# Patient Record
Sex: FEMALE | Race: WHITE | NOT HISPANIC OR LATINO | Employment: OTHER | URBAN - METROPOLITAN AREA
[De-identification: names, ages, dates, MRNs, and addresses within clinical notes are randomized per-mention and may not be internally consistent; named-entity substitution may affect disease eponyms.]

---

## 2017-01-23 ENCOUNTER — TRANSCRIBE ORDERS (OUTPATIENT)
Dept: ADMINISTRATIVE | Facility: HOSPITAL | Age: 62
End: 2017-01-23

## 2017-01-23 ENCOUNTER — APPOINTMENT (OUTPATIENT)
Dept: LAB | Facility: MEDICAL CENTER | Age: 62
End: 2017-01-23
Payer: COMMERCIAL

## 2017-01-23 DIAGNOSIS — G56.01 CARPAL TUNNEL SYNDROME ON RIGHT: ICD-10-CM

## 2017-01-23 DIAGNOSIS — G56.01 CARPAL TUNNEL SYNDROME ON RIGHT: Primary | ICD-10-CM

## 2017-01-23 LAB
ANION GAP SERPL CALCULATED.3IONS-SCNC: 10 MMOL/L (ref 4–13)
BASOPHILS # BLD AUTO: 0.01 THOUSANDS/ΜL (ref 0–0.1)
BASOPHILS NFR BLD AUTO: 0 % (ref 0–1)
BUN SERPL-MCNC: 14 MG/DL (ref 5–25)
CALCIUM SERPL-MCNC: 8.9 MG/DL (ref 8.3–10.1)
CHLORIDE SERPL-SCNC: 109 MMOL/L (ref 100–108)
CO2 SERPL-SCNC: 23 MMOL/L (ref 21–32)
CREAT SERPL-MCNC: 0.77 MG/DL (ref 0.6–1.3)
EOSINOPHIL # BLD AUTO: 0 THOUSAND/ΜL (ref 0–0.61)
EOSINOPHIL NFR BLD AUTO: 0 % (ref 0–6)
ERYTHROCYTE [DISTWIDTH] IN BLOOD BY AUTOMATED COUNT: 12.8 % (ref 11.6–15.1)
GFR SERPL CREATININE-BSD FRML MDRD: >60 ML/MIN/1.73SQ M
GLUCOSE SERPL-MCNC: 146 MG/DL (ref 65–140)
HCT VFR BLD AUTO: 44.2 % (ref 34.8–46.1)
HGB BLD-MCNC: 14.6 G/DL (ref 11.5–15.4)
LYMPHOCYTES # BLD AUTO: 1.58 THOUSANDS/ΜL (ref 0.6–4.47)
LYMPHOCYTES NFR BLD AUTO: 23 % (ref 14–44)
MCH RBC QN AUTO: 28.3 PG (ref 26.8–34.3)
MCHC RBC AUTO-ENTMCNC: 33 G/DL (ref 31.4–37.4)
MCV RBC AUTO: 86 FL (ref 82–98)
MONOCYTES # BLD AUTO: 0.56 THOUSAND/ΜL (ref 0.17–1.22)
MONOCYTES NFR BLD AUTO: 8 % (ref 4–12)
NEUTROPHILS # BLD AUTO: 4.55 THOUSANDS/ΜL (ref 1.85–7.62)
NEUTS SEG NFR BLD AUTO: 69 % (ref 43–75)
NRBC BLD AUTO-RTO: 0 /100 WBCS
PLATELET # BLD AUTO: 344 THOUSANDS/UL (ref 149–390)
PMV BLD AUTO: 9.4 FL (ref 8.9–12.7)
POTASSIUM SERPL-SCNC: 3.9 MMOL/L (ref 3.5–5.3)
RBC # BLD AUTO: 5.15 MILLION/UL (ref 3.81–5.12)
SODIUM SERPL-SCNC: 142 MMOL/L (ref 136–145)
WBC # BLD AUTO: 6.75 THOUSAND/UL (ref 4.31–10.16)

## 2017-01-23 PROCEDURE — 36415 COLL VENOUS BLD VENIPUNCTURE: CPT

## 2017-01-23 PROCEDURE — 80048 BASIC METABOLIC PNL TOTAL CA: CPT

## 2017-01-23 PROCEDURE — 85025 COMPLETE CBC W/AUTO DIFF WBC: CPT

## 2017-09-06 ENCOUNTER — GENERIC CONVERSION - ENCOUNTER (OUTPATIENT)
Dept: OTHER | Facility: OTHER | Age: 62
End: 2017-09-06

## 2017-09-06 DIAGNOSIS — R92.2 INCONCLUSIVE MAMMOGRAM: ICD-10-CM

## 2017-09-06 DIAGNOSIS — Z12.31 ENCOUNTER FOR SCREENING MAMMOGRAM FOR MALIGNANT NEOPLASM OF BREAST: ICD-10-CM

## 2018-01-11 NOTE — PROGRESS NOTES
Assessment   1  History of Rectocele, female (618 04) (N81 6)  2  Encounter for routine gynecological examination (V72 31) (Z01 419)    Plan   Encounter for routine gynecological examination    · Start: Vagifem 10 MCG Vaginal Tablet; Sig hs in vagina for 14 nights the use 2 x a week  Rx By: Cinthia Castaneda; Dispense: 90 Days ; #:2 X 18 Tablet Box; Refill: 3;For: Encounter   for routine gynecological examination; NIESHA = N; Sent To: [facility]   · Follow-up visit in 1 year Evaluation and Treatment  Follow-up  Status: Hold For -  Scheduling  Requested for: 51Xmz2337  Ordered; For: Encounter for routine gynecological examination;  Ordered By: Jacky Pizano  Performed:   Due: 96Epo6323    Follow-up visit in 1 year Evaluation and Treatment Follow-up Status: Complete Done: 92HAW1084  Ordered; For: Encounter for routine gynecological examination; Ordered By: Cinthia Castaneda Performed:  Due: 63Wti8134; Last Updated By: Jorge Luis Cantu; 4/18/2016 1:30:41 PM   Follow-up visit in 1 year Evaluation and Treatment Follow-up Status: Complete Done: 11Rqj8078  Ordered; For: Encounter for routine gynecological examination; Ordered By: Cinthia Castaneda Performed:  Due: 79TJL5935; Last Updated By: Natanael Dudley; 4/21/2016 2:12:03 PM     Discussion/Summary    Pt with H and P as described  While pt does have some vaginal relaxation and atrophy, there is also some apical tenderness that reproduces "cutting" feeling with vaginal intercourse and I wonder it it is her ovary at top of vaginal being bumped  To change to Vagifem daily x 14 days, then twice weekly , faithful use urged, and water soluble lubrication  , positional change, if motivated to have intercourse  HPV of vagina done as matter of record  Mammography ordered  what unhappy  Since no Pap on record, despite hyst, will do HPVHR  Mammography reordered  Osteopenia on BMD explained and urged weight bearing exercise, Vit D and Calcium 1200 mg per day   Urged colonoscopy, but pt opposed to both it and rectal exam  Pt to RT for yearly visit in 1 year or prn  Chief Complaint  59year old woman, for annual gyn visit      History of Present Illness  HPI: Pt cared for in past by by Zenobia Hoskins MD , , a post menopausal Gr 4 P 4, witn apparent vaginall hysterectomy remotely (27 years ago) for benign reasons, along with anterior and posterior colporrhaphies about 10 years ago  Not happy with current status of vagina, feeling A and P repair not noldiing up too well, and feeling dry, both to pt and "rough' to  with intercourse  Also perhaps some upper vaginal pain with intercourse  Used vaginal Estrace to some extent in past, but thought it was messy and didn't persist  Is luke warm about treating this issue  Occasional odor and irrriation only      No  bleeding, pain, urinary nor bowel complaints  Had breast implants remotely but has some regrets about the choice to do so    Mammography normal in     No Pap available, likley due to hyst  BMD in  with -1 6 result  , not calcium user  Colonoscopy not desired to date  Concerned about weight gain and whether this could be thyroid related  No pain, breast, uriinary or bowel complaint  Now for yearly gyn visit  GYN HM, Adult Female [facility]: The patient is being seen for a gynecology evaluation  General Health:   Reproductive health: the patient is postmenopausal    Screening: Additional History:  See HPI  Review of Systems  Additional findings include See HPI  Constitutional: as noted in HPI  Breasts: as noted in HPI  Gastrointestinal: as noted in HPI  Genitourinary: as noted in HPI  ROS reviewed  OB History  Pregnancy History (Brief):   Prior pregnancies: : 6  Para: 3 (full-term)   Delivery type: 3 vaginal       Active Problems   1  Acute conjunctivitis (372 00) (H10 30)  2  Acute ethmoidal sinusitis (461 2) (J01 20)  3  Acute maxillary sinusitis (461 0) (J01 00)  4   Acute pharyngitis (462) (J02 9)  5  Carpal tunnel syndrome of right wrist (354 0) (G56 01)  6  Depressed mood (311) (F32 9)  7  Dysfunction of both Eustachian tubes (381 81) (H69 83)  8  Encounter for routine gynecological examination (V72 31) (Z01 419)  9  Encounter for screening mammogram for malignant neoplasm of breast (V76 12)   (Z12 31)  10  Osteoarthritis of hand (715 94) (M19 049)  11  Osteopenia (733 90) (M85 80)  12  Rosacea (695 3) (L71 9)  13  Trigger middle finger of right hand (727 03) (M65 331)  14  Trochanteric bursitis of right hip (726 5) (M70 61)    Past Medical History    · History Of 4  Previous Pregnancies (V61 5)   · History of acute bronchitis (V12 69) (Z87 09)   · History of chicken pox (V12 09) (Z86 19)   · History of mumps (V12 09) (Z86 19)   · History of Measles (055 9) (B05 9)   · History of Previous Pregnancies Resulted In 3  Living Children   · History of Rectocele, female (618 04) (N81 6)    Surgical History    · History of Anterior Colporrhaphy, Repair Of Cystocele   · History of Breast Surgery Enlargement Procedure   · History of Hysterectomy   · History of Rectocele Repair   · History of Shoulder Arthroscopy With Rotator Cuff Repair   · History of Tubal Ligation    The surgical history was reviewed and updated today  Family History    · No pertinent family history    · Family history of Heart Disease (V17 49)   · Family history of Osteoporosis (V17 81)    Social History    · Always uses seat belt   · Being A Social Drinker   · Exercising Regularly   · Never A Smoker    Current Meds  1  Clarithromycin 500 MG Oral Tablet; TAKE 1 TABLET TWICE DAILY UNTIL FINISHED; Therapy: 86CJU0361 to (Evaluate:08Jan2016)  Requested for: 67Mba4588; Last   Rx:47Daz6165 Ordered  2  Multiple Vitamins Oral Tablet; Take 1 daily; Therapy: 96KUC3210 to (Last Rx:31Mar2014) Ordered  3   Mupirocin Calcium 2 % External Cream; apply to inside of nostrils at hs;   Therapy: 19DWT0997 to (Last Rx:13Beb9997)  Requested for: 13BWV1685 Ordered    Allergies   1  Penicillins    Physical Exam    Constitutional   General appearance: No acute distress, well appearing and well nourished  Neck   Neck: Normal, supple, trachea midline, no masses  Thyroid: Normal, no thyromegaly  Genitourinary   External genitalia: Normal and no lesions appreciated  Vagina: Abnormal   mild cystocele and rectoce, mildy atrophic, slight tender above vagina (?ovary)  Urethra: Normal     Urethral meatus: Normal     Bladder: Normal, soft, non-tender and no prolapse or masses appreciated  Cervix: Surgically absent  Uterus: Surgically absent  Adnexa/parametria: Normal, non-tender and no fullness or masses appreciated  Anus, perineum, and rectum: Normal sphincter tone, no masses, and no prolapse  Chest   Breasts: Normal and no dimpling or skin changes noted  Implants quite firm bilaterally  Abdomen   Abdomen: Normal, non-tender, and no organomegaly noted  Liver and spleen: No hepatomegaly or splenomegaly  Examination for hernias: No hernias appreciated  Lymphatic   Palpation of lymph nodes in neck, axillae, groin and/or other locations: No lymphadenopathy or masses noted  Skin   Skin and subcutaneous tissue: Normal skin turgor and no rashes      Palpation of skin and subcutaneous tissue: Normal     Psychiatric   Orientation to person, place, and time: Normal     Mood and affect: Normal        Signatures   Electronically signed by : Racheal Walden MD; Apr 28 2016 10:00AM EST                       (Author)

## 2018-01-11 NOTE — MISCELLANEOUS
Message  Pt infomred that thyroid studies normal  Discussed weight gain again, with suggestions  Also noted to  and begin Vagifem as ordered   [address]      Signatures   Electronically signed by : Levi Ceja MD; Apr 22 2016 10:21AM EST                       (Author)

## 2018-01-22 VITALS — HEIGHT: 69 IN | SYSTOLIC BLOOD PRESSURE: 130 MMHG | DIASTOLIC BLOOD PRESSURE: 76 MMHG

## 2018-03-06 ENCOUNTER — OFFICE VISIT (OUTPATIENT)
Dept: FAMILY MEDICINE CLINIC | Facility: CLINIC | Age: 63
End: 2018-03-06
Payer: COMMERCIAL

## 2018-03-06 VITALS
HEIGHT: 68 IN | RESPIRATION RATE: 17 BRPM | DIASTOLIC BLOOD PRESSURE: 70 MMHG | SYSTOLIC BLOOD PRESSURE: 140 MMHG | WEIGHT: 162 LBS | HEART RATE: 75 BPM | BODY MASS INDEX: 24.55 KG/M2 | OXYGEN SATURATION: 98 % | TEMPERATURE: 98.7 F

## 2018-03-06 DIAGNOSIS — Z12.31 SCREENING MAMMOGRAM, ENCOUNTER FOR: ICD-10-CM

## 2018-03-06 DIAGNOSIS — R20.0 NUMBNESS OF RIGHT HAND: ICD-10-CM

## 2018-03-06 DIAGNOSIS — E78.2 MIXED HYPERLIPIDEMIA: ICD-10-CM

## 2018-03-06 DIAGNOSIS — R09.81 SINUS CONGESTION: ICD-10-CM

## 2018-03-06 DIAGNOSIS — M70.61 TROCHANTERIC BURSITIS OF RIGHT HIP: ICD-10-CM

## 2018-03-06 DIAGNOSIS — M19.041 OSTEOARTHRITIS OF BOTH HANDS, UNSPECIFIED OSTEOARTHRITIS TYPE: ICD-10-CM

## 2018-03-06 DIAGNOSIS — M19.042 OSTEOARTHRITIS OF BOTH HANDS, UNSPECIFIED OSTEOARTHRITIS TYPE: ICD-10-CM

## 2018-03-06 DIAGNOSIS — M85.80 OSTEOPENIA, UNSPECIFIED LOCATION: ICD-10-CM

## 2018-03-06 DIAGNOSIS — M65.331 TRIGGER MIDDLE FINGER OF RIGHT HAND: ICD-10-CM

## 2018-03-06 DIAGNOSIS — H93.8X3 PLUGGED FEELING IN EAR, BILATERAL: ICD-10-CM

## 2018-03-06 DIAGNOSIS — M43.6 NECK STIFFNESS: ICD-10-CM

## 2018-03-06 DIAGNOSIS — M25.531 RIGHT WRIST PAIN: ICD-10-CM

## 2018-03-06 DIAGNOSIS — J02.9 SORE THROAT: Primary | ICD-10-CM

## 2018-03-06 PROCEDURE — 99214 OFFICE O/P EST MOD 30 MIN: CPT | Performed by: FAMILY MEDICINE

## 2018-03-06 PROCEDURE — 3008F BODY MASS INDEX DOCD: CPT | Performed by: FAMILY MEDICINE

## 2018-03-06 RX ORDER — AZITHROMYCIN 250 MG/1
TABLET, FILM COATED ORAL
Qty: 6 TABLET | Refills: 0 | Status: SHIPPED | OUTPATIENT
Start: 2018-03-06 | End: 2018-03-10

## 2018-03-06 NOTE — PROGRESS NOTES
Assessment/Plan:         Diagnoses and all orders for this visit:    Sore throat  -     azithromycin (ZITHROMAX) 250 mg tablet; 2 po day 1, then 1 po daily for 4 days    Plugged feeling in ear, bilateral  -     azithromycin (ZITHROMAX) 250 mg tablet; 2 po day 1, then 1 po daily for 4 days    Sinus congestion  -     azithromycin (ZITHROMAX) 250 mg tablet; 2 po day 1, then 1 po daily for 4 days    Osteopenia, unspecified location  -     Lipid panel; Future  -     Comprehensive metabolic panel; Future  -     CBC and differential; Future  -     TSH, 3rd generation with T4 reflex; Future  -     DXA bone density spine hip and pelvis; Future    Screening mammogram, encounter for  -     Mammo screening bilateral w 3d & cad; Future    Neck stiffness    Trigger middle finger of right hand    Trochanteric bursitis of right hip    Numbness of right hand    Osteoarthritis of both hands, unspecified osteoarthritis type    Right wrist pain    Mixed hyperlipidemia          Subjective:   Chief Complaint   Patient presents with    Earache    Headache    Sore Throat        Patient ID: Gricel Torre is a 57 y o  female  Per c/c reviewed by me  Lost power on Friday and sx began , getting worse, using hot tea  Did travel to [place] about 2 weeks ago, but no known ill contacts  Incidentally lost 30 pounds doing [employer] program        The following portions of the patient's history were reviewed and updated as appropriate: allergies, current medications, past family history, past medical history, past social history, past surgical history and problem list     Review of Systems   Constitutional: Negative  HENT: Positive for ear pain, postnasal drip, rhinorrhea and sore throat  Negative for congestion, ear discharge, hearing loss, mouth sores, nosebleeds, sinus pain, sinus pressure, sneezing, tinnitus, trouble swallowing and voice change  Eyes: Negative  Respiratory: Negative  Cardiovascular: Negative  Gastrointestinal: Negative  Neurological: Positive for headaches  Negative for dizziness, facial asymmetry and light-headedness  Objective:      /70 (BP Location: Left arm, Patient Position: Sitting, Cuff Size: Standard)   Pulse 75   Temp 98 7 °F (37 1 °C)   Resp 17   Ht 5' 8" (1 727 m)   Wt 73 5 kg (162 lb)   SpO2 98%   BMI 24 63 kg/m²          Physical Exam   Constitutional: She appears well-developed and well-nourished  She is cooperative  Non-toxic appearance  She does not have a sickly appearance  She does not appear ill  No distress  HENT:   Head: Normocephalic and atraumatic  Right Ear: Hearing, tympanic membrane, external ear and ear canal normal    Left Ear: Hearing, tympanic membrane, external ear and ear canal normal    Nose: Mucosal edema and rhinorrhea present  No epistaxis  Right sinus exhibits no maxillary sinus tenderness and no frontal sinus tenderness  Left sinus exhibits no maxillary sinus tenderness and no frontal sinus tenderness  Mouth/Throat: Uvula is midline and mucous membranes are normal  Posterior oropharyngeal erythema present  No oropharyngeal exudate, posterior oropharyngeal edema or tonsillar abscesses  Eyes: Lids are normal  Pupils are equal, round, and reactive to light  Neck: Trachea normal  Neck supple  No thyroid mass and no thyromegaly present  Cardiovascular: Normal rate, regular rhythm and normal heart sounds  Pulmonary/Chest: Effort normal and breath sounds normal    Lymphadenopathy:     She has no cervical adenopathy  Neurological: She is alert  Skin: Skin is warm and dry  No rash noted  She is not diaphoretic  Nursing note and vitals reviewed

## 2018-03-08 NOTE — PATIENT INSTRUCTIONS
Toward end of visit, pt requests some kind of documentation or note to try to get stand-up type desk at work due to numerous musculoskeletal c/o and problems with symptoms aggravated when she works at [employer]   Advised f/u after testing

## 2018-07-03 ENCOUNTER — TRANSCRIBE ORDERS (OUTPATIENT)
Dept: ADMINISTRATIVE | Facility: HOSPITAL | Age: 63
End: 2018-07-03

## 2018-07-03 DIAGNOSIS — S86.302A PERONEAL TENDON INJURY, LEFT, INITIAL ENCOUNTER: Primary | ICD-10-CM

## 2018-07-06 ENCOUNTER — HOSPITAL ENCOUNTER (OUTPATIENT)
Dept: RADIOLOGY | Facility: IMAGING CENTER | Age: 63
Discharge: HOME | End: 2018-07-06
Payer: COMMERCIAL

## 2018-07-06 DIAGNOSIS — S86.302A PERONEAL TENDON INJURY, LEFT, INITIAL ENCOUNTER: ICD-10-CM

## 2018-07-06 PROCEDURE — 73721 MRI JNT OF LWR EXTRE W/O DYE: CPT

## 2018-07-14 ENCOUNTER — APPOINTMENT (OUTPATIENT)
Dept: LAB | Facility: IMAGING CENTER | Age: 63
End: 2018-07-14
Payer: COMMERCIAL

## 2018-07-14 ENCOUNTER — TRANSCRIBE ORDERS (OUTPATIENT)
Dept: ADMINISTRATIVE | Facility: HOSPITAL | Age: 63
End: 2018-07-14

## 2018-07-14 DIAGNOSIS — Z01.812 PRE-OPERATIVE LABORATORY EXAMINATION: Primary | ICD-10-CM

## 2018-07-14 DIAGNOSIS — Z01.812 PRE-OPERATIVE LABORATORY EXAMINATION: ICD-10-CM

## 2018-07-14 LAB
ANION GAP SERPL CALCULATED.3IONS-SCNC: 6 MMOL/L (ref 4–13)
BUN SERPL-MCNC: 17 MG/DL (ref 5–25)
CALCIUM SERPL-MCNC: 9.1 MG/DL (ref 8.3–10.1)
CHLORIDE SERPL-SCNC: 110 MMOL/L (ref 100–108)
CO2 SERPL-SCNC: 24 MMOL/L (ref 21–32)
CREAT SERPL-MCNC: 0.79 MG/DL (ref 0.6–1.3)
ERYTHROCYTE [DISTWIDTH] IN BLOOD BY AUTOMATED COUNT: 12.1 % (ref 11.6–15.1)
GFR SERPL CREATININE-BSD FRML MDRD: 79 ML/MIN/1.73SQ M
GLUCOSE P FAST SERPL-MCNC: 108 MG/DL (ref 65–99)
HCT VFR BLD AUTO: 45 % (ref 34.8–46.1)
HGB BLD-MCNC: 14.6 G/DL (ref 11.5–15.4)
MCH RBC QN AUTO: 28.7 PG (ref 26.8–34.3)
MCHC RBC AUTO-ENTMCNC: 32.4 G/DL (ref 31.4–37.4)
MCV RBC AUTO: 89 FL (ref 82–98)
PLATELET # BLD AUTO: 381 THOUSANDS/UL (ref 149–390)
PMV BLD AUTO: 9.4 FL (ref 8.9–12.7)
POTASSIUM SERPL-SCNC: 4.6 MMOL/L (ref 3.5–5.3)
RBC # BLD AUTO: 5.08 MILLION/UL (ref 3.81–5.12)
SODIUM SERPL-SCNC: 140 MMOL/L (ref 136–145)
WBC # BLD AUTO: 6.11 THOUSAND/UL (ref 4.31–10.16)

## 2018-07-14 PROCEDURE — 85610 PROTHROMBIN TIME: CPT

## 2018-07-14 PROCEDURE — 36415 COLL VENOUS BLD VENIPUNCTURE: CPT

## 2018-07-14 PROCEDURE — 85027 COMPLETE CBC AUTOMATED: CPT

## 2018-07-14 PROCEDURE — 80048 BASIC METABOLIC PNL TOTAL CA: CPT

## 2018-07-15 LAB
INR PPP: 0.96 (ref 0.86–1.17)
PROTHROMBIN TIME: 12.9 SECONDS (ref 11.8–14.2)

## 2018-07-16 RX ORDER — GENTAMICIN SULFATE 3 MG/ML
SOLUTION/ DROPS OPHTHALMIC
COMMUNITY
End: 2018-07-19

## 2018-07-16 RX ORDER — CLINDAMYCIN HYDROCHLORIDE 300 MG/1
CAPSULE ORAL
COMMUNITY
End: 2018-07-19

## 2018-07-16 RX ORDER — IBUPROFEN 200 MG
200 TABLET ORAL
COMMUNITY
End: 2018-07-19

## 2018-07-16 RX ORDER — AZITHROMYCIN 250 MG/1
TABLET, FILM COATED ORAL
COMMUNITY
End: 2018-07-19

## 2018-07-19 ENCOUNTER — OFFICE VISIT (OUTPATIENT)
Dept: FAMILY MEDICINE CLINIC | Facility: CLINIC | Age: 63
End: 2018-07-19
Payer: COMMERCIAL

## 2018-07-19 VITALS
WEIGHT: 165 LBS | DIASTOLIC BLOOD PRESSURE: 70 MMHG | TEMPERATURE: 98.3 F | OXYGEN SATURATION: 97 % | HEART RATE: 86 BPM | BODY MASS INDEX: 24.37 KG/M2 | RESPIRATION RATE: 16 BRPM | SYSTOLIC BLOOD PRESSURE: 124 MMHG

## 2018-07-19 DIAGNOSIS — R73.01 IMPAIRED FASTING GLUCOSE: ICD-10-CM

## 2018-07-19 DIAGNOSIS — Z12.11 SCREEN FOR COLON CANCER: ICD-10-CM

## 2018-07-19 DIAGNOSIS — Z01.818 PREOPERATIVE CLEARANCE: Primary | ICD-10-CM

## 2018-07-19 PROBLEM — R92.2 DENSE BREASTS: Status: ACTIVE | Noted: 2017-09-06

## 2018-07-19 PROBLEM — R92.30 DENSE BREASTS: Status: ACTIVE | Noted: 2017-09-06

## 2018-07-19 PROCEDURE — 99214 OFFICE O/P EST MOD 30 MIN: CPT | Performed by: PHYSICIAN ASSISTANT

## 2018-07-19 NOTE — PROGRESS NOTES
Routine Follow-up    Eva Nowak 70 y o  female   Date:  7/19/2018      Assessment and Plan:    Jose Padilla was seen today for pre-op exam     Diagnoses and all orders for this visit:    Preoperative clearance    Screen for colon cancer  -     Ambulatory referral to Gastroenterology;  Future    Impaired fasting glucose            HPI:  Chief Complaint   Patient presents with    Pre-op Exam      PERONEAL TENDON REPAIR (Left Ankle)     HPI    ROS: Review of Systems    Past Medical History:   Diagnosis Date    Rectocele     last assessed 4/18/16     Patient Active Problem List   Diagnosis    Sore throat    Plugged feeling in ear, bilateral    Sinus congestion    Mixed hyperlipidemia    Osteopenia    Right wrist pain    Osteoarthritis of hand    Numbness of right hand    Trochanteric bursitis of right hip    Trigger middle finger of right hand    Rosacea    Elevated CPK    Depressed mood    Dense breasts       Past Surgical History:   Procedure Laterality Date    BREAST SURGERY      enlargement    COLPORRHAPHY      anterior, repair of cystocele, last assessed 8/14/14    HYSTERECTOMY      REPAIR RECTOCELE      last assessed 8/14/14    SHOULDER ARTHROSCOPY W/ ROTATOR CUFF REPAIR  12/2014    [facility]    TUBAL LIGATION         Social History     Social History    Marital status: /Civil Union     Spouse name: N/A    Number of children: N/A    Years of education: N/A     Social History Main Topics    Smoking status: Never Smoker    Smokeless tobacco: Never Used    Alcohol use Yes      Comment: social    Drug use: No    Sexual activity: Not Asked     Other Topics Concern    None     Social History Narrative    Always uses seatbelt    Exercising regular           Family History   Problem Relation Age of Onset    No Known Problems Father     Heart disease Family     Osteoporosis Family        Allergies   Allergen Reactions    Penicillins Seizures       No current outpatient prescriptions on file        Physical Exam:  /70   Pulse 86   Temp 98 3 °F (36 8 °C)   Resp 16   Wt 74 8 kg (165 lb)   SpO2 97%   BMI 24 37 kg/m²     Physical Exam      Labs:  Lab Results   Component Value Date    WBC 6 11 07/14/2018    HGB 14 6 07/14/2018    HCT 45 0 07/14/2018    MCV 89 07/14/2018     07/14/2018     Lab Results   Component Value Date     07/14/2018    K 4 6 07/14/2018     (H) 07/14/2018    CO2 24 07/14/2018    ANIONGAP 6 07/14/2018    BUN 17 07/14/2018    CREATININE 0 79 07/14/2018    GLUCOSE 146 (H) 01/23/2017    GLUF 108 (H) 07/14/2018    CALCIUM 9 1 07/14/2018    AST 27 11/28/2016    ALT 45 11/28/2016    ALKPHOS 109 11/28/2016    PROT 7 5 11/28/2016    BILITOT 0 47 11/28/2016    EGFR 79 07/14/2018

## 2018-07-19 NOTE — PROGRESS NOTES
PRE-OPERATIVE EVALUATION  FAMILY PRACTICE AT [facility]    NAME: Ruth Hines  AGE: 70 y o  SEX: female  : 1955     DATE: 2018    Internal Medicine Pre-Operative Evaluation      Chief Complaint: Pre-operative Evaluation     Surgery: PERONEAL TENDON REPAIR (Left Ankle)  Anticipated Date of Surgery: 18  Referring Provider: Mele Harvey DPM     History of Present Illness:     Ruth Hines is a 70 y o  female who presents to the office today for a preoperative consultation  Planned anesthesia is general and with block   Patient has a bleeding risk of: no recent abnormal bleeding  Patient does not have objections to receiving blood products if needed  Current anti-platelet/anti-coagulation medications that the patient is prescribed includes: none  Assessment of Chronic Conditions:   - none   - has impaired fasting glucose on blood work, refused hga1c      Assessment of Cardiac Risk:  · Denies unstable or severe angina or MI in the last 6 weeks or history of stent placement in the last year   · Denies decompensated heart failure (e g  New onset heart failure, NYHA functional class IV heart failure, or worsening existing heart failure)  · Denies significant arrhythmias such as high grade AV block, symptomatic ventricular arrhythmia, newly recognized ventricular tachycardia, supraventricular tachycardia with resting heart rate >100, or symptomatic bradycardia  · Denies severe heart valve disease including aortic stenosis or symptomatic mitral stenosis     Exercise Capacity:  · Able to walk 4 blocks without symptoms?: Yes  · Able to walk 2 flights without symptoms?: Yes    /Prior Anesthesia Reactions: Yes, nausea/vomiting     Personal history of venous thromboembolic disease? No    History of steroid use for >2 weeks within last year? No       Review of Systems:     Review of Systems   Constitutional: Negative for chills, fatigue, fever and unexpected weight change     HENT: Negative for congestion, ear pain, hearing loss, nosebleeds, sore throat and trouble swallowing  Eyes: Negative for pain, discharge and visual disturbance  Respiratory: Negative for cough, shortness of breath and wheezing  Cardiovascular: Negative for chest pain, palpitations and leg swelling  Gastrointestinal: Negative for abdominal pain, blood in stool, constipation, diarrhea, nausea and vomiting  Endocrine: Negative for cold intolerance and heat intolerance  Genitourinary: Negative for difficulty urinating, dysuria and hematuria  Musculoskeletal: Positive for arthralgias and gait problem (wearing boot on LLE)  Negative for back pain and myalgias  Skin: Negative for color change, rash and wound  Neurological: Negative for dizziness, syncope, weakness, light-headedness and headaches  Hematological: Negative for adenopathy  Does not bruise/bleed easily  Psychiatric/Behavioral: Negative for confusion and sleep disturbance  The patient is not nervous/anxious  Current Problem List:     Patient Active Problem List   Diagnosis    Mixed hyperlipidemia    Osteopenia    Osteoarthritis of hand    Numbness of right hand    Trochanteric bursitis of right hip    Rosacea    Elevated CPK    Depressed mood    Dense breasts       Allergies: Allergies   Allergen Reactions    Penicillins Seizures       Physical Exam:     No current outpatient prescriptions on file      Past Medical History:       Past Medical History:   Diagnosis Date    Rectocele     last assessed 4/18/16        Past Surgical History:   Procedure Laterality Date    BREAST SURGERY      enlargement    COLPORRHAPHY      anterior, repair of cystocele, last assessed 8/14/14    HYSTERECTOMY      REPAIR RECTOCELE      last assessed 8/14/14    SHOULDER ARTHROSCOPY W/ ROTATOR CUFF REPAIR  12/2014    Dr Jere Mcgraw at [facility]    TUBAL LIGATION          Family History   Problem Relation Age of Onset    No Known Problems Father    Chuck Saab Heart disease Family     Osteoporosis Family         Social History     Social History    Marital status: /Civil Union     Spouse name: N/A    Number of children: N/A    Years of education: N/A     Occupational History    Not on file  Social History Main Topics    Smoking status: Never Smoker    Smokeless tobacco: Never Used    Alcohol use Yes      Comment: social    Drug use: No    Sexual activity: Not on file     Other Topics Concern    Not on file     Social History Narrative    Always uses seatbelt    Exercising regular            Physical Exam:     Physical Exam   Constitutional: She is oriented to person, place, and time  She appears well-developed and well-nourished  No distress  HENT:   Head: Normocephalic and atraumatic  Right Ear: External ear normal    Left Ear: External ear normal    Mouth/Throat: Oropharynx is clear and moist    Eyes: Conjunctivae are normal  Pupils are equal, round, and reactive to light  Right eye exhibits no discharge  Left eye exhibits no discharge  Neck: Normal range of motion  Neck supple  Cardiovascular: Normal rate, regular rhythm, normal heart sounds and intact distal pulses  No murmur heard  Pulmonary/Chest: Breath sounds normal  No respiratory distress  Musculoskeletal: She exhibits deformity (LLE boot )  She exhibits no edema  Lymphadenopathy:     She has no cervical adenopathy  Neurological: She is alert and oriented to person, place, and time  No cranial nerve deficit  Skin: Skin is warm and dry  No rash noted  She is not diaphoretic  Psychiatric: She has a normal mood and affect  Her behavior is normal         Data:     Pre-operative work-up    Laboratory Results: I have personally reviewed the pertinent laboratory results/reports    CBC wnl, bnp wnl with exception of glucose 110, pt inr wnl     EKG: I have personally reviewed pertinent reports  2016 NSR        Assessment & Recommendations:     1   Preoperative clearance   - cleared, no further testing needed   2  Screen for colon cancer  - refuses colonoscopy   3  Impaired fasting glucose  - refused hga1c        Pre-Op Evaluation Assessment  Known risk factors for perioperative complications: None  Cardiac Risk Estimation: per the Revised Cardiac Risk Index (Circ  100:1043, 1999), the patient's risk factors for cardiac complications include none, putting her in: RCI RISK CLASS I (0 risk factors, risk of major cardiac compl  appr  0 5%)  Current medications which may produce withdrawal symptoms if withheld perioperatively: none  Pre-Op Evaluation Plan  1  Further preoperative workup as follows:   - None; no further preoperative work-up is required    2  Medication Management/Recommendations:   - Not applicable, not on any medications    3  Prophylaxis for cardiac events with perioperative beta-blockers: not indicated  4  Patient requires further consultation with: None    Clearance  Patient is CLEARED for surgery without any additional cardiac testing       [facility] AT [address] [place] [address]  Phone#  [phone]  Fax#  [phone]

## 2018-07-20 ENCOUNTER — ANESTHESIA EVENT (OUTPATIENT)
Dept: PERIOP | Facility: HOSPITAL | Age: 63
End: 2018-07-20
Payer: COMMERCIAL

## 2018-07-20 PROBLEM — R09.81 SINUS CONGESTION: Status: RESOLVED | Noted: 2018-03-06 | Resolved: 2018-07-20

## 2018-07-23 ENCOUNTER — HOSPITAL ENCOUNTER (OUTPATIENT)
Dept: RADIOLOGY | Facility: HOSPITAL | Age: 63
Setting detail: OUTPATIENT SURGERY
Discharge: HOME | End: 2018-07-23
Payer: COMMERCIAL

## 2018-07-23 ENCOUNTER — ANESTHESIA (OUTPATIENT)
Dept: PERIOP | Facility: HOSPITAL | Age: 63
End: 2018-07-23
Payer: COMMERCIAL

## 2018-07-23 ENCOUNTER — HOSPITAL ENCOUNTER (OUTPATIENT)
Facility: HOSPITAL | Age: 63
Setting detail: OUTPATIENT SURGERY
Discharge: HOME | End: 2018-07-23
Attending: PODIATRIST | Admitting: PODIATRIST
Payer: COMMERCIAL

## 2018-07-23 VITALS
DIASTOLIC BLOOD PRESSURE: 72 MMHG | RESPIRATION RATE: 20 BRPM | TEMPERATURE: 97.5 F | SYSTOLIC BLOOD PRESSURE: 164 MMHG | HEIGHT: 69 IN | OXYGEN SATURATION: 98 % | HEART RATE: 66 BPM | BODY MASS INDEX: 23.7 KG/M2 | WEIGHT: 160 LBS

## 2018-07-23 DIAGNOSIS — Z98.890 S/P FOOT SURGERY, LEFT: Primary | ICD-10-CM

## 2018-07-23 DIAGNOSIS — M76.72 PERONEAL TENDINITIS OF LEFT LOWER EXTREMITY: ICD-10-CM

## 2018-07-23 DEVICE — (100MG CHRG BY MG)GRAFT CLARIX FLO 100MG: Type: IMPLANTABLE DEVICE | Site: ANKLE | Status: FUNCTIONAL

## 2018-07-23 RX ORDER — PROPOFOL 10 MG/ML
INJECTION, EMULSION INTRAVENOUS CONTINUOUS PRN
Status: DISCONTINUED | OUTPATIENT
Start: 2018-07-23 | End: 2018-07-23 | Stop reason: SURG

## 2018-07-23 RX ORDER — ASPIRIN 81 MG/1
81 TABLET ORAL 2 TIMES DAILY
Qty: 60 TABLET | Refills: 0 | Status: SHIPPED | OUTPATIENT
Start: 2018-07-23 | End: 2020-09-28 | Stop reason: ALTCHOICE

## 2018-07-23 RX ORDER — MIDAZOLAM HYDROCHLORIDE 1 MG/ML
INJECTION INTRAMUSCULAR; INTRAVENOUS AS NEEDED
Status: DISCONTINUED | OUTPATIENT
Start: 2018-07-23 | End: 2018-07-23 | Stop reason: SURG

## 2018-07-23 RX ORDER — ONDANSETRON 2 MG/ML
INJECTION INTRAMUSCULAR; INTRAVENOUS AS NEEDED
Status: DISCONTINUED | OUTPATIENT
Start: 2018-07-23 | End: 2018-07-23 | Stop reason: SURG

## 2018-07-23 RX ORDER — CLINDAMYCIN PHOSPHATE 900 MG/50ML
900 INJECTION INTRAVENOUS ONCE
Status: COMPLETED | OUTPATIENT
Start: 2018-07-23 | End: 2018-07-23

## 2018-07-23 RX ORDER — OMEPRAZOLE 20 MG/1
20 CAPSULE, DELAYED RELEASE ORAL 2 TIMES DAILY
Qty: 30 CAPSULE | Refills: 0 | Status: SHIPPED | OUTPATIENT
Start: 2018-07-23 | End: 2019-06-13

## 2018-07-23 RX ORDER — MAGNESIUM HYDROXIDE 1200 MG/15ML
LIQUID ORAL AS NEEDED
Status: DISCONTINUED | OUTPATIENT
Start: 2018-07-23 | End: 2018-07-23 | Stop reason: HOSPADM

## 2018-07-23 RX ORDER — FENTANYL CITRATE/PF 50 MCG/ML
50 SYRINGE (ML) INJECTION
Status: DISCONTINUED | OUTPATIENT
Start: 2018-07-23 | End: 2018-07-23 | Stop reason: HOSPADM

## 2018-07-23 RX ORDER — PROPOFOL 10 MG/ML
INJECTION, EMULSION INTRAVENOUS AS NEEDED
Status: DISCONTINUED | OUTPATIENT
Start: 2018-07-23 | End: 2018-07-23 | Stop reason: SURG

## 2018-07-23 RX ORDER — SODIUM CHLORIDE 9 MG/ML
125 INJECTION, SOLUTION INTRAVENOUS CONTINUOUS
Status: DISCONTINUED | OUTPATIENT
Start: 2018-07-23 | End: 2018-07-23 | Stop reason: HOSPADM

## 2018-07-23 RX ORDER — SCOLOPAMINE TRANSDERMAL SYSTEM 1 MG/1
1 PATCH, EXTENDED RELEASE TRANSDERMAL ONCE AS NEEDED
Status: DISCONTINUED | OUTPATIENT
Start: 2018-07-23 | End: 2018-07-23 | Stop reason: HOSPADM

## 2018-07-23 RX ORDER — ONDANSETRON 4 MG/1
4 TABLET, FILM COATED ORAL EVERY 8 HOURS PRN
Qty: 20 TABLET | Refills: 0 | Status: SHIPPED | OUTPATIENT
Start: 2018-07-23 | End: 2019-06-13

## 2018-07-23 RX ORDER — ONDANSETRON 2 MG/ML
4 INJECTION INTRAMUSCULAR; INTRAVENOUS ONCE AS NEEDED
Status: DISCONTINUED | OUTPATIENT
Start: 2018-07-23 | End: 2018-07-23 | Stop reason: HOSPADM

## 2018-07-23 RX ORDER — OXYCODONE HYDROCHLORIDE AND ACETAMINOPHEN 5; 325 MG/1; MG/1
1 TABLET ORAL EVERY 4 HOURS PRN
Status: DISCONTINUED | OUTPATIENT
Start: 2018-07-23 | End: 2018-07-23 | Stop reason: HOSPADM

## 2018-07-23 RX ORDER — ROPIVACAINE HYDROCHLORIDE 5 MG/ML
INJECTION, SOLUTION EPIDURAL; INFILTRATION; PERINEURAL AS NEEDED
Status: DISCONTINUED | OUTPATIENT
Start: 2018-07-23 | End: 2018-07-23 | Stop reason: SURG

## 2018-07-23 RX ORDER — IBUPROFEN 600 MG/1
600 TABLET ORAL EVERY 6 HOURS PRN
Qty: 30 TABLET | Refills: 0 | Status: SHIPPED | OUTPATIENT
Start: 2018-07-23 | End: 2019-06-13

## 2018-07-23 RX ORDER — OXYCODONE HYDROCHLORIDE AND ACETAMINOPHEN 5; 325 MG/1; MG/1
1 TABLET ORAL EVERY 4 HOURS PRN
Qty: 3 TABLET | Refills: 0 | Status: SHIPPED | OUTPATIENT
Start: 2018-07-23 | End: 2018-08-02

## 2018-07-23 RX ORDER — BUPIVACAINE HYDROCHLORIDE 5 MG/ML
INJECTION, SOLUTION PERINEURAL AS NEEDED
Status: DISCONTINUED | OUTPATIENT
Start: 2018-07-23 | End: 2018-07-23 | Stop reason: HOSPADM

## 2018-07-23 RX ORDER — KETOROLAC TROMETHAMINE 30 MG/ML
INJECTION, SOLUTION INTRAMUSCULAR; INTRAVENOUS AS NEEDED
Status: DISCONTINUED | OUTPATIENT
Start: 2018-07-23 | End: 2018-07-23 | Stop reason: SURG

## 2018-07-23 RX ORDER — DEXAMETHASONE SODIUM PHOSPHATE 4 MG/ML
INJECTION, SOLUTION INTRA-ARTICULAR; INTRALESIONAL; INTRAMUSCULAR; INTRAVENOUS; SOFT TISSUE AS NEEDED
Status: DISCONTINUED | OUTPATIENT
Start: 2018-07-23 | End: 2018-07-23 | Stop reason: SURG

## 2018-07-23 RX ADMIN — KETOROLAC TROMETHAMINE 30 MG: 30 INJECTION, SOLUTION INTRAMUSCULAR at 18:35

## 2018-07-23 RX ADMIN — CLINDAMYCIN PHOSPHATE 900 MG: 18 INJECTION, SOLUTION INTRAMUSCULAR; INTRAVENOUS at 17:04

## 2018-07-23 RX ADMIN — ROPIVACAINE HYDROCHLORIDE 25 ML: 5 INJECTION, SOLUTION EPIDURAL; INFILTRATION; PERINEURAL at 16:42

## 2018-07-23 RX ADMIN — ONDANSETRON HYDROCHLORIDE 4 MG: 2 INJECTION, SOLUTION INTRAVENOUS at 18:22

## 2018-07-23 RX ADMIN — PROPOFOL 75 MCG/KG/MIN: 10 INJECTION, EMULSION INTRAVENOUS at 17:03

## 2018-07-23 RX ADMIN — DEXAMETHASONE SODIUM PHOSPHATE 4 MG: 4 INJECTION, SOLUTION INTRAMUSCULAR; INTRAVENOUS at 17:10

## 2018-07-23 RX ADMIN — MIDAZOLAM 4 MG: 1 INJECTION INTRAMUSCULAR; INTRAVENOUS at 16:38

## 2018-07-23 RX ADMIN — SODIUM CHLORIDE: 0.9 INJECTION, SOLUTION INTRAVENOUS at 17:52

## 2018-07-23 RX ADMIN — SODIUM CHLORIDE 125 ML/HR: 0.9 INJECTION, SOLUTION INTRAVENOUS at 14:33

## 2018-07-23 RX ADMIN — SCOPALAMINE 1 PATCH: 1 PATCH, EXTENDED RELEASE TRANSDERMAL at 16:30

## 2018-07-23 RX ADMIN — PROPOFOL 30 MG: 10 INJECTION, EMULSION INTRAVENOUS at 17:03

## 2018-07-23 NOTE — H&P (VIEW-ONLY)
PRE-OPERATIVE EVALUATION  FAMILY PRACTICE AT [facility]    NAME: Isaac Devries  AGE: 70 y o  SEX: female  : 1955     DATE: 2018    Internal Medicine Pre-Operative Evaluation      Chief Complaint: Pre-operative Evaluation     Surgery: PERONEAL TENDON REPAIR (Left Ankle)  Anticipated Date of Surgery: 18  Referring Provider: Sara Valero DPM     History of Present Illness:     Isaac Devries is a 70 y o  female who presents to the office today for a preoperative consultation  Planned anesthesia is general and with block   Patient has a bleeding risk of: no recent abnormal bleeding  Patient does not have objections to receiving blood products if needed  Current anti-platelet/anti-coagulation medications that the patient is prescribed includes: none  Assessment of Chronic Conditions:   - none   - has impaired fasting glucose on blood work, refused hga1c      Assessment of Cardiac Risk:  · Denies unstable or severe angina or MI in the last 6 weeks or history of stent placement in the last year   · Denies decompensated heart failure (e g  New onset heart failure, NYHA functional class IV heart failure, or worsening existing heart failure)  · Denies significant arrhythmias such as high grade AV block, symptomatic ventricular arrhythmia, newly recognized ventricular tachycardia, supraventricular tachycardia with resting heart rate >100, or symptomatic bradycardia  · Denies severe heart valve disease including aortic stenosis or symptomatic mitral stenosis     Exercise Capacity:  · Able to walk 4 blocks without symptoms?: Yes  · Able to walk 2 flights without symptoms?: Yes    /Prior Anesthesia Reactions: Yes, nausea/vomiting     Personal history of venous thromboembolic disease? No    History of steroid use for >2 weeks within last year? No       Review of Systems:     Review of Systems   Constitutional: Negative for chills, fatigue, fever and unexpected weight change     HENT: Negative for congestion, ear pain, hearing loss, nosebleeds, sore throat and trouble swallowing  Eyes: Negative for pain, discharge and visual disturbance  Respiratory: Negative for cough, shortness of breath and wheezing  Cardiovascular: Negative for chest pain, palpitations and leg swelling  Gastrointestinal: Negative for abdominal pain, blood in stool, constipation, diarrhea, nausea and vomiting  Endocrine: Negative for cold intolerance and heat intolerance  Genitourinary: Negative for difficulty urinating, dysuria and hematuria  Musculoskeletal: Positive for arthralgias and gait problem (wearing boot on LLE)  Negative for back pain and myalgias  Skin: Negative for color change, rash and wound  Neurological: Negative for dizziness, syncope, weakness, light-headedness and headaches  Hematological: Negative for adenopathy  Does not bruise/bleed easily  Psychiatric/Behavioral: Negative for confusion and sleep disturbance  The patient is not nervous/anxious  Current Problem List:     Patient Active Problem List   Diagnosis    Mixed hyperlipidemia    Osteopenia    Osteoarthritis of hand    Numbness of right hand    Trochanteric bursitis of right hip    Rosacea    Elevated CPK    Depressed mood    Dense breasts       Allergies: Allergies   Allergen Reactions    Penicillins Seizures       Physical Exam:     No current outpatient prescriptions on file      Past Medical History:       Past Medical History:   Diagnosis Date    Rectocele     last assessed 4/18/16        Past Surgical History:   Procedure Laterality Date    BREAST SURGERY      enlargement    COLPORRHAPHY      anterior, repair of cystocele, last assessed 8/14/14    HYSTERECTOMY      REPAIR RECTOCELE      last assessed 8/14/14    SHOULDER ARTHROSCOPY W/ ROTATOR CUFF REPAIR  12/2014    Dr Maryjane Whitaker at [facility]    TUBAL LIGATION          Family History   Problem Relation Age of Onset    No Known Problems Father    [employer] Heart disease Family     Osteoporosis Family         Social History     Social History    Marital status: /Civil Union     Spouse name: N/A    Number of children: N/A    Years of education: N/A     Occupational History    Not on file  Social History Main Topics    Smoking status: Never Smoker    Smokeless tobacco: Never Used    Alcohol use Yes      Comment: social    Drug use: No    Sexual activity: Not on file     Other Topics Concern    Not on file     Social History Narrative    Always uses seatbelt    Exercising regular            Physical Exam:     Physical Exam   Constitutional: She is oriented to person, place, and time  She appears well-developed and well-nourished  No distress  HENT:   Head: Normocephalic and atraumatic  Right Ear: External ear normal    Left Ear: External ear normal    Mouth/Throat: Oropharynx is clear and moist    Eyes: Conjunctivae are normal  Pupils are equal, round, and reactive to light  Right eye exhibits no discharge  Left eye exhibits no discharge  Neck: Normal range of motion  Neck supple  Cardiovascular: Normal rate, regular rhythm, normal heart sounds and intact distal pulses  No murmur heard  Pulmonary/Chest: Breath sounds normal  No respiratory distress  Musculoskeletal: She exhibits deformity (LLE boot )  She exhibits no edema  Lymphadenopathy:     She has no cervical adenopathy  Neurological: She is alert and oriented to person, place, and time  No cranial nerve deficit  Skin: Skin is warm and dry  No rash noted  She is not diaphoretic  Psychiatric: She has a normal mood and affect  Her behavior is normal         Data:     Pre-operative work-up    Laboratory Results: I have personally reviewed the pertinent laboratory results/reports    CBC wnl, bnp wnl with exception of glucose 110, pt inr wnl     EKG: I have personally reviewed pertinent reports  2016 NSR        Assessment & Recommendations:     1   Preoperative clearance   - cleared, no further testing needed   2  Screen for colon cancer  - refuses colonoscopy   3  Impaired fasting glucose  - refused hga1c        Pre-Op Evaluation Assessment  Known risk factors for perioperative complications: None  Cardiac Risk Estimation: per the Revised Cardiac Risk Index (Circ  100:[id number], 1999), the patient's risk factors for cardiac complications include none, putting her in: RCI RISK CLASS I (0 risk factors, risk of major cardiac compl  appr  0 5%)  Current medications which may produce withdrawal symptoms if withheld perioperatively: none  Pre-Op Evaluation Plan  1  Further preoperative workup as follows:   - None; no further preoperative work-up is required    2  Medication Management/Recommendations:   - Not applicable, not on any medications    3  Prophylaxis for cardiac events with perioperative beta-blockers: not indicated  4  Patient requires further consultation with: None    Clearance  Patient is CLEARED for surgery without any additional cardiac testing       Tushar Henry, [facility]  [address] [address]  Phone#  [phone]  Fax#  [phone]

## 2018-07-23 NOTE — ANESTHESIA PROCEDURE NOTES
Peripheral Block    Patient location during procedure: holding area  Start time: 7/23/2018 4:38 PM  Removal time: 7/23/2018 4:43 PM  Reason for block: at surgeon's request and post-op pain management  Staffing  Anesthesiologist: Abel Chand  Performed: anesthesiologist   Preanesthetic Checklist  Completed: patient identified, site marked, surgical consent, pre-op evaluation, timeout performed, IV checked, risks and benefits discussed and monitors and equipment checked  Peripheral Block  Patient position: right lateral decubitus  Prep: ChloraPrep  Patient monitoring: heart rate, continuous pulse ox and frequent blood pressure checks  Block type: sciatic  Laterality: left  Injection technique: single-shot  Procedures: nerve stimulator  Local infiltration: ropivacaine  Infiltration strength: 0 5 %  Dose: 25 mL  Needle  Needle type: archibald     Needle length: 10 cm  Needle localization: anatomical landmarks and nerve stimulator  Assessment  Injection assessment: incremental injection, negative aspiration for heme and no paresthesia on injection  Paresthesia pain: none  Heart rate change: no  Slow fractionated injection: yes  Post-procedure:  site cleaned  patient tolerated the procedure well with no immediate complications

## 2018-07-23 NOTE — ANESTHESIA PREPROCEDURE EVALUATION
Review of Systems/Medical History  Patient summary reviewed  Chart reviewed  History of anesthetic complications PONV    Cardiovascular  Hyperlipidemia,    Pulmonary  Negative pulmonary ROS        GI/Hepatic  Negative GI/hepatic ROS          Negative  ROS        Endo/Other  Negative endo/other ROS      GYN  Negative gynecology ROS          Hematology  Negative hematology ROS      Musculoskeletal    Arthritis     Neurology  Negative neurology ROS      Psychology   Negative psychology ROS              Physical Exam    Airway    Mallampati score: I  TM Distance: >3 FB  Neck ROM: full     Dental   No notable dental hx     Cardiovascular  Rhythm: regular, Rate: normal, Cardiovascular exam normal    Pulmonary  Pulmonary exam normal Breath sounds clear to auscultation,     Other Findings        Anesthesia Plan  ASA Score- 2     Anesthesia Type- IV sedation with anesthesia and regional with ASA Monitors  Additional Monitors:   Airway Plan:     Comment: Does NOT want GA because of nausea in past   Told Dr Emi Ruvalcaba this preference  Amenable to block(s) and TIVA  Plan Factors-Patient not instructed to abstain from smoking on day of procedure  Patient did not smoke on day of surgery  Induction- intravenous  Postoperative Plan-     Informed Consent- Anesthetic plan and risks discussed with patient and spouse

## 2018-07-23 NOTE — DISCHARGE SUMMARY
Discharge Summary Outpatient Procedure Podiatry -   Mindy Mon 70 y o  female MRN: [id number]  Unit/Bed#: [facility] Encounter: [id number]    Admission Date: 7/23/2018     Admitting Diagnosis: Peroneal tendinitis of left lower extremity [M76 72]    Discharge Diagnosis: same    Procedures Performed: PERONEAL TENDON REPAIR: 87618 (CPT®)    Complications: none    Condition at Discharge: stable    Discharge instructions/Information to patient and family:   See after visit summary for information provided to patient and family  Provisions for Follow-Up Care/Important appointments:  See after visit summary for information related to follow-up care and any pertinent home health orders  Discharge Medications:  See after visit summary for reconciled discharge medications provided to patient and family

## 2018-07-23 NOTE — OP NOTE
OPERATIVE REPORT  PATIENT NAME: Eric Velazquez    :  1955  MRN: [id number]  Pt Location: [facility] OR ROOM 02    SURGERY DATE: 2018    Surgeon(s) and Role:     * Yenifer Wiley DPM - Primary     * Esperanza Lynne DPM - Assisting    Preop Diagnosis:  Peroneal tendinitis of left lower extremity [M76 70]    Post-Op Diagnosis Codes:     * Peroneal tendinitis of left lower extremity [M76 70]    Procedure(s) (LRB):  PERONEAL TENDON REPAIR (Left)    Specimen(s):  None     Estimated Blood Loss:   10 mL    Drains:  None     Anesthesia Type:   General w/ Sciatic Block    Hemostasis:  Anatomical dissection  68 minutes on a left pneumatic thigh tourniquet at 300 mmHg    Materials:  2-0 vicryl  3-0 vicryl  3-0 nylon  4-0 monocryl  Clarix Graft Mike - Amniox     Injectables:  10 cc of 0 5% marcaine plain    Operative Indications:  Peroneal tendinitis of left lower extremity [M76 70]    Operative Findings:  Small tear of the peroneus longus tendon to the distal aspect at the level of the cuboid   Extensive longitudinal tear of the peroneus brevis tendon starting at the level of the lateral mallelous and extending proximally   Os peroneum sesamoid bone is noted in the peroneus longus tendon    Complications:   None    Procedure and Technique:  Under mild sedation, the patient was brought into the operating room and placed on the operating room table in the supine position  A pneumatic thigh tourniquet was then placed around the patient's left thigh with ample webril padding  The patient was positioned laterally with the use of a bean bag  No pre-op injection was necessary as the patient received a sciatic nerve block in pre-op holding by the anesthesiologist  A time out was performed to confirm the correct patient, procedure and site with all parties in agreement  The foot and leg was then scrubbed, prepped and draped in the usual aseptic manner   An esmarch bandage was utilized to [employer] the patients foot and the pneumatic ankle tourniquet was then inflated  The esmarch bandage was removed and the foot was placed on the operating room table  Attention was directed to the lateral left foot and ankle along the course of the peroneal tendons where patient suffered an injury which caused a tear noted in the MRI  Using a skin marker, all landmarks were identified and drawn for surgical planning  Using a 15 blade, a curvilinear skin incision is made extending from the distal 1/3 aspect of the fibula to the level of the cuboid  Dissection is carried down deep using Metzenbaum scissors  Sural nerve and its communicating branches were identified and retracted  All bleeders were cauterized and ligated as necessary  The retinaculum was resected at the level of the leg and foot for adequate exposure of the peroneal tendons  At the level of the cuboid the os perineum sesamoid bone is noted in the peroneus longus tendon  A tear is identified distal and plantarly at the level of the cuboid  This was repaired with a 4-0 monocryl suture  The peroneus brevis tendon was identified and explored and noted to have a extensive longitudinal tear extending from the distal aspect of the lateral malleolus to approximately on the distal 1/3 of the fibular shaft  The peroneus brevis tendon partial tear was resected, and then retubularized to with 4-0 monocryl suture in a running interlocking type fashion  Adequate repair of the tear to both peroneal tendons was affected  The wound was irrigated with copious amount of sterile saline solution with a bulb syringe  Retinaculum and deep closure was obtained to with 2-0 vicryl suture  The peroneal tendons (longus and brevis) were injected with Amniox Graft Clarix Mike injectable  Subcutaneous tissue was reapproximated with 3-0 vicryl suture  Skin closure was obtained with 3-0 nylon suture in a horizontal mattress suture technique      A postoperative injection consisting of 10 ml of 0 5% bupivacaine plain was performed  The incision site was then dressed with xeroform, dry sterile dressing, webril, ACE wrap and posterior splint  The pneumatic thigh tourniquet was deflated at 68 minutes and normal hyperemic flush was noted to all digits  The patient tolerated the procedure and anesthesia well and was transported to the PACU with vital signs stable       I was present for the entire procedure    Patient Disposition:  PACU  and hemodynamically stable    SIGNATURE: Arlyn Schaumann, DPM  DATE: July 23, 2018  TIME: 7:06 PM

## 2018-07-23 NOTE — DISCHARGE INSTRUCTIONS
Dr Cosme Person, DPM  Post-op surgery Instructions    Pain / Swelling   There is expected to be some discomfort, swelling and bruising of the foot  You might see some blood on the bandage  This is not a cause for alarm  However, if there is active or persistent bleeding (blood running out of the bandage while at rest) - call the office at once (or) go to a [facility] ER and ask them to page the podiatry residents   Apply an ice bag to the top of your ankle for 30 minutes for each waking hour, for the first 72 hours  This should be discontinued when sleeping  This will also work through your cast if you have one  Ice must not leak and wet the dressings  Also, using the ice inappropriately can cause permanent nerve damage   Your foot should be elevated as much as possible for the first 72 hours  The foot should be above heart level  If your foot is below heart level, throbbing and pain will increase  When sleeping, elevation can be accomplished by putting a small hard suitcase between the box spring and mattress at the foot of the bed  Walking and standing will increase pain, throbbing and bleeding   Persistent pain despite elevation and your pain meds can many times be relieved by removing the tight brown compression layer (called the ACE wrap) that is over the white gauze dressing  If you are elevating and taking your pain meds and pain is still severe, remove this brown stretchy layer but leave the gauze intact  Wait 30 minutes  If the pain subsides, reapply the ACE so its not so tight  If pain doesnt get better, call your doctor  Dressings / Casts   Do not remove your surgical dressings - they will be changed at your doctor appointment  Do not allow surgical dressings to get wet  Sponge baths should be used until the sutures are removed  Do not try to keep the foot dry using a garbage bag and tape - this rarely works    If you get your dressings or cast wet - call your doctor immediately   If your cast or dressings feel tight - elevate your foot for 30 minutes  If this doesnt help and you feel tingling or see toe discoloration - call your doctor or go to a [facility] ER and ask them to page the podiatry residents   Do not put things in your cast such as powder, coat hangers to scratch, etc  This can cause skin damage and infections  Infection   If you have a fever at or above 100 degrees, chills, sweats, or see red streaks rising above the dressing or smell odor / see pus (creamy white drainage), call your doctor immediately or go to a [facility] ER and ask them to page the podiatry residents  Constipation   If you have severe constipation after surgery, this can be due to the pain medication  Notify your doctor and special medication will be prescribed to deal with this  Blood Clots   If you had surgery and are in a cast, have an external fixation device, or are non-weightbearing using crutches, a knee scooter, a wheelchair, a walker, or an iWalk device - you need to be on a blood thinner  Your doctor will prescribe one of the regimens below  If you run out of the blood thinner checked off below before you are walking normally on your foot and out of your cast - notify your doctor immediately so you can get a refill  Not doing so can lead to blood clots and serious complications including death  Aspirin 81mg twice a day    Numbness   It is normal for your foot to be numb until about dinner time  If youve had a popliteal block procedure, you might be numb until the following day  When you start to feel pins and needles in the foot - this means the block is wearing off  That is the appropriate time to take your pain medication  Pain Medication   Do not supplement your pain medication with over the counter drugs, old leftover pain medications, or extra Tylenol   You must discuss any additional medications with your doctor prior to taking them for pain    Driving   No driving is allowed without discussion with the doctor  Ambulation   Nonweightbearing to surgical foot   If given a flat, stiff shoe / darco wedge shoe, Do not walk at all without it   Use a device (cane, walker, crutches) to take some weight off of the foot when walking   If instructed not to put weight on the surgical foot, use the following:  o Crutches and Rolling Walker     o Putting weight on the foot will lead to complications  Deep Vein Thrombosis Prevention   WHAT YOU NEED TO KNOW:   Deep vein thrombosis (DVT) is a blood clot that forms in a deep vein of the body  The deep veins in the legs, thighs, and hips are the most common sites for DVT  DVT can also occur in your arms  The clot prevents the normal flow of blood in the vein  The blood backs up and causes pain and swelling  The DVT can break into smaller pieces and travel to your lungs and cause a blockage called a pulmonary embolism  A pulmonary embolism can become life-threatening  DISCHARGE INSTRUCTIONS:   Call 911 for any of the following:   · You feel lightheaded, short of breath, and have chest pain  · You cough up blood  Seek care immediately if:   · Your arm or leg feels warm, tender, and painful  It may look swollen and red  Contact your healthcare provider if:   · You have questions or concerns about your condition or care  Risk factors for DVT:  A DVT can happen to anybody, but certain things can increase your risk  You may be at higher risk if you have had DVT in the past  You may also be at risk if you have a family member who has had blood clots   The following conditions also increase your risk:  · Limited activity caused by bed rest, a leg cast, or sitting for long periods    · Injury to a deep vein, or surgery    · A blood disorder that makes your blood clot faster than normal, such as factor V Leiden mutation    · Age older than 60 years    · Use of hormone replacement therapy or some types of birth control medicine    · Pregnancy, and for 6 weeks after childbirth     · Cancer or heart failure     · A catheter placed in a large vein    · Smoking    · Obesity or varicose veins  Prevent DVT:   · Guidelines for everyone:      ¨ Maintain a healthy weight  Ask your healthcare provider how much you should weigh  Ask him to help you create a weight loss plan if you are overweight  ¨ Do not smoke  Nicotine and other chemicals in cigarettes and cigars can damage blood vessels and increase your risk for a DVT  Ask your healthcare provider for information if you currently smoke and need help to quit  E-cigarettes or smokeless tobacco still contain nicotine  Talk to your healthcare provider before you use these products  ¨ Move regularly if you sit for long periods of time  If you travel by car or work at a desk, move and stretch in your seat several times each hour  In an airplane, get up and walk every hour  Exercise your legs while sitting by raising and lowering your heels  Keep your toes on the floor while you do this  You can also raise and lower your toes while keeping your heels on the floor  Also tighten and release your leg muscles while sitting  ¨ Exercise regularly  to help increase your blood flow  Walking is a good low-impact exercise  Talk to your healthcare provider about the best exercise plan for you  · Guidelines for people at high risk for DVT:      ¨ Take blood thinner medicines as directed  Your healthcare provider may recommend blood thinners and other medicines to help prevent blood clots  ¨ Wear pressure stockings as directed  The stockings are tight and put pressure on your legs  This improves blood flow and helps prevent clots  Wear the stockings during the day  Do not wear them when you sleep  ¨ Elevate your legs  above the level of your heart as often as you can  This will help decrease swelling and pain   Prop your legs on pillows or blankets to keep them elevated comfortably  ¨ Get up and move as directed after surgery or an injury, or during an illness  Early and regular movement can help decrease your risk for DVT by helping to increase your blood flow  Ask your healthcare provider what type of activity you need and how often you should do it  ¨ Change body positions often if you are bedridden  Ask for help to change your position every 1 to 2 hours  Follow up with your healthcare provider as directed:  Write down your questions so you remember to ask them during your visits  © 2017 [facility] Information is for End User's use only and may not be sold, redistributed or otherwise used for commercial purposes  All illustrations and images included in CareNotes® are the copyrighted property of A D A M [employer]  or Akbar Boone  The above information is an  only  It is not intended as medical advice for individual conditions or treatments  Talk to your doctor, nurse or pharmacist before following any medical regimen to see if it is safe and effective for you

## 2018-09-17 ENCOUNTER — ANNUAL EXAM (OUTPATIENT)
Dept: OBGYN CLINIC | Age: 63
End: 2018-09-17
Payer: COMMERCIAL

## 2018-09-17 VITALS
HEIGHT: 69 IN | SYSTOLIC BLOOD PRESSURE: 120 MMHG | BODY MASS INDEX: 24.44 KG/M2 | DIASTOLIC BLOOD PRESSURE: 80 MMHG | WEIGHT: 165 LBS

## 2018-09-17 DIAGNOSIS — Z01.419 ENCOUNTER FOR GYNECOLOGICAL EXAMINATION WITHOUT ABNORMAL FINDING: Primary | ICD-10-CM

## 2018-09-17 DIAGNOSIS — Z12.31 ENCOUNTER FOR SCREENING MAMMOGRAM FOR MALIGNANT NEOPLASM OF BREAST: ICD-10-CM

## 2018-09-17 DIAGNOSIS — R92.2 DENSE BREAST: ICD-10-CM

## 2018-09-17 DIAGNOSIS — M81.0 OSTEOPOROSIS, UNSPECIFIED OSTEOPOROSIS TYPE, UNSPECIFIED PATHOLOGICAL FRACTURE PRESENCE: ICD-10-CM

## 2018-09-17 PROCEDURE — S0612 ANNUAL GYNECOLOGICAL EXAMINA: HCPCS | Performed by: OBSTETRICS & GYNECOLOGY

## 2018-09-17 NOTE — PATIENT INSTRUCTIONS
Osteopenia   AMBULATORY CARE:   Osteopenia  is a condition that causes bone loss and low bone mineral density (BMD)  Low BMD can weaken your bones and increase your risk for fractures  Osteopenia does not cause signs or symptoms  You may not know you have it until you break a bone  Osteopenia must be managed or treated so it does not worsen and become osteoporosis  Osteoporosis is a serious condition that causes bones to become weak, brittle, and easily fractured  Treatment for osteopenia  depends on your risk for fracture  If your risk is low, you may only need to make exercise, nutrition, and other lifestyle changes to manage osteopenia  The changes can help prevent more bone mineral loss and reduce your risk for fractures  If your risk is high, you may be given medicines to help strengthen your bones, prevent bone breakdown, or increase bone formation  Manage osteopenia:   · Exercise as directed  Do weight-bearing exercises, such as brisk walking, dancing, or yoga  Weight-bearing exercises help build or maintain bone  Exercises such as swimming and bike riding are non-weight-bearing and will not build or maintain bone  Weightlifting also helps strengthen bones and build muscle  Extra muscle can help protect your bones  Your healthcare provider may recommend weightlifting 3 times per week as part of your exercise routine  · Increase calcium and vitamin D as directed  Calcium and vitamin D work together to help build bone  The body deposits calcium into the bones until about age 27  You will then need to get enough calcium and vitamin D to maintain what your bones are storing  Your healthcare provider may tell you to eat more dairy products, such as milk and cheese, for calcium  Spinach, salmon, and dried beans are also good sources of calcium  Cereal, bread, and orange juice may be fortified with vitamin D  You also get vitamin D from exposure to sunlight   Your healthcare provider may also suggest a calcium or vitamin D supplement  Do not take supplements unless directed  · Limit or do not drink alcohol as directed  Alcohol can take calcium from your bones and increase your risk for fractures  Ask your healthcare provider if it is safe for you to drink alcohol  Women should limit alcohol to 1 drink per day  Men should limit alcohol to 2 drinks per day  A drink of alcohol is 12 ounces of beer, 5 ounces of wine, or 1½ ounces of liquor  · Do not smoke  Nicotine can damage blood vessels and make it more difficult to manage your osteopenia  Smoking also affects bone density and increases your risk for bone fractures  Do not use e-cigarettes or smokeless tobacco in place of cigarettes or to help you quit  They still contain nicotine  Ask your healthcare provider for information if you currently smoke and need help quitting  Ask your healthcare provider for information if you need help quitting  · Prevent falls  Decrease your risk for falling by removing items from the floor and removing loose carpets  Turn the lights on where you will be walking  Follow up with your healthcare provider as directed:  Write down your questions so you remember to ask them during your visits  © 2017 [facility] Information is for End User's use only and may not be sold, redistributed or otherwise used for commercial purposes  All illustrations and images included in CareNotes® are the copyrighted property of A D A M , Inc  or Akbar Boone  The above information is an  only  It is not intended as medical advice for individual conditions or treatments  Talk to your doctor, nurse or pharmacist before following any medical regimen to see if it is safe and effective for you

## 2018-09-17 NOTE — ASSESSMENT & PLAN NOTE
Pap/HPV not indicated  Mammogram ordered  Colonoscopy refused  DEXA ordered    Encourage healthy diet, exercise, Calcium 1200mg per day and at least 800 iu Vitamin D daily      Vulvovaginal atrophy - OTC lubricants discussed

## 2018-09-17 NOTE — PROGRESS NOTES
Assessment/Plan:    Encounter for gynecological examination without abnormal finding  Pap/HPV not indicated  Mammogram ordered  Colonoscopy refused  DEXA ordered    Encourage healthy diet, exercise, Calcium 1200mg per day and at least 800 iu Vitamin D daily  Vulvovaginal atrophy - OTC lubricants discussed       Diagnoses and all orders for this visit:    Encounter for gynecological examination without abnormal finding    Encounter for screening mammogram for malignant neoplasm of breast  -     Mammo screening bilateral w 3d & cad; Future    Dense breast  -     Mammo screening bilateral w 3d & cad; Future    Osteoporosis, unspecified osteoporosis type, unspecified pathological fracture presence  -     DXA bone density spine hip and pelvis; Future          Subjective:      Patient ID: Pierre Arteaga is a 70 y o  female  Patient here for yearly exam  No current complaints at this time  Age of first period: 13years old   (1miscarriage)  Lmp: TLH   Last mammo: 14 dense breast (3d) BR2 benign  Colonoscopy: never states she will not get done  Dexa: 12 osteopenia (due)   Patient is not a smoker  Patient is not a drinker  Patient exercises  Recent fall in July, on crutches but overall doing well    Vulvovaginal atrophy, not sexually active      Gynecologic Exam   The patient's pertinent negatives include no genital itching, genital lesions, genital odor, genital rash, pelvic pain, vaginal bleeding or vaginal discharge  The patient is experiencing no pain  Associated symptoms include painful intercourse  Pertinent negatives include no chills, constipation, diarrhea, fever, frequency, nausea, sore throat, urgency or vomiting  The following portions of the patient's history were reviewed and updated as appropriate:   She  has a past medical history of Rectocele    She   Patient Active Problem List    Diagnosis Date Noted    Encounter for gynecological examination without abnormal finding 09/17/2018    Dense breasts 09/06/2017    Elevated CPK 12/01/2016    Numbness of right hand 11/22/2016    Depressed mood 12/29/2015    Osteoarthritis of hand 08/14/2014    Trochanteric bursitis of right hip 08/14/2014    Rosacea 08/14/2014    Osteopenia 03/31/2014    Mixed hyperlipidemia 02/08/2012   G4P  She  has a past surgical history that includes Colporrhaphy; Breast surgery; Hysterectomy; Repair rectocele; Shoulder arthroscopy w/ rotator cuff repair (12/2014); Tubal ligation; and pr repair/graft flex foot tendon (Left, 7/23/2018)  Her family history includes Heart disease in her family; No Known Problems in her father; Osteoporosis in her family  She  reports that she has never smoked  She has never used smokeless tobacco  She reports that she drinks alcohol  She reports that she does not use drugs  Current Outpatient Prescriptions   Medication Sig Dispense Refill    aspirin (ECOTRIN LOW STRENGTH) 81 mg EC tablet Take 1 tablet (81 mg total) by mouth 2 (two) times a day for 30 days 60 tablet 0    ibuprofen (MOTRIN) 600 mg tablet Take 1 tablet (600 mg total) by mouth every 6 (six) hours as needed for mild pain (Patient not taking: Reported on 9/17/2018 ) 30 tablet 0    omeprazole (PriLOSEC) 20 mg delayed release capsule Take 1 capsule (20 mg total) by mouth 2 (two) times a day (Patient not taking: Reported on 9/17/2018 ) 30 capsule 0    ondansetron (ZOFRAN) 4 mg tablet Take 1 tablet (4 mg total) by mouth every 8 (eight) hours as needed for nausea or vomiting (Patient not taking: Reported on 9/17/2018 ) 20 tablet 0     No current facility-administered medications for this visit        Current Outpatient Prescriptions on File Prior to Visit   Medication Sig    aspirin (ECOTRIN LOW STRENGTH) 81 mg EC tablet Take 1 tablet (81 mg total) by mouth 2 (two) times a day for 30 days    ibuprofen (MOTRIN) 600 mg tablet Take 1 tablet (600 mg total) by mouth every 6 (six) hours as needed for mild pain (Patient not taking: Reported on 9/17/2018 )    omeprazole (PriLOSEC) 20 mg delayed release capsule Take 1 capsule (20 mg total) by mouth 2 (two) times a day (Patient not taking: Reported on 9/17/2018 )    ondansetron (ZOFRAN) 4 mg tablet Take 1 tablet (4 mg total) by mouth every 8 (eight) hours as needed for nausea or vomiting (Patient not taking: Reported on 9/17/2018 )     No current facility-administered medications on file prior to visit  She is allergic to penicillins       Review of Systems   Constitutional: Negative for activity change, appetite change, chills, fatigue and fever  HENT: Negative for rhinorrhea, sneezing and sore throat  Eyes: Negative for visual disturbance  Respiratory: Negative for cough, shortness of breath and wheezing  Cardiovascular: Negative for chest pain, palpitations and leg swelling  Gastrointestinal: Negative for abdominal distention, constipation, diarrhea, nausea and vomiting  Genitourinary: Negative for difficulty urinating, frequency, pelvic pain, urgency and vaginal discharge  Musculoskeletal: Positive for gait problem  Neurological: Negative for syncope and light-headedness  Objective:      /80 (BP Location: Left arm, Patient Position: Sitting, Cuff Size: Standard)   Ht 5' 9" (1 753 m)   Wt 74 8 kg (165 lb)   BMI 24 37 kg/m²          Physical Exam   Genitourinary: No breast swelling, tenderness, discharge or bleeding  There is no rash, tenderness, lesion or injury on the right labia  There is no rash, tenderness, lesion or injury on the left labia  Right adnexum displays no mass, no tenderness and no fullness  Left adnexum displays no mass, no tenderness and no fullness  No bleeding in the vagina  No vaginal discharge found     Genitourinary Comments: Thin, atrophic vaginal mucosa shortened vagina

## 2018-09-25 ENCOUNTER — EVALUATION (OUTPATIENT)
Dept: PHYSICAL THERAPY | Facility: MEDICAL CENTER | Age: 63
End: 2018-09-25
Payer: COMMERCIAL

## 2018-09-25 DIAGNOSIS — M79.672 LEFT FOOT PAIN: Primary | ICD-10-CM

## 2018-09-25 PROCEDURE — G8991 OTHER PT/OT GOAL STATUS: HCPCS | Performed by: PHYSICAL THERAPIST

## 2018-09-25 PROCEDURE — 97110 THERAPEUTIC EXERCISES: CPT | Performed by: PHYSICAL THERAPIST

## 2018-09-25 PROCEDURE — 97162 PT EVAL MOD COMPLEX 30 MIN: CPT | Performed by: PHYSICAL THERAPIST

## 2018-09-25 PROCEDURE — G8990 OTHER PT/OT CURRENT STATUS: HCPCS | Performed by: PHYSICAL THERAPIST

## 2018-09-25 NOTE — LETTER
2018    Tamia Barros [address] [facility]  [address]  [facility] [address] [facility]    Patient: Rosette Trejo   YOB: 1955   Date of Visit: 2018     Encounter Diagnosis     ICD-10-CM    1  Left foot pain M79 672        Dear Dr Brendan Ramirez:    Please review the attached Plan of Care from [facility] recent visit  Please verify that you agree therapy should continue by signing the attached document and sending it back to our office  If you have any questions or concerns, please don't hesitate to call  Sincerely,    Nilam Martins, PT      Referring Provider:      I certify that I have read the below Plan of Care and certify the need for these services furnished under this plan of treatment while under my care  Tamia Barros DPM  [address]  [address] [place]          PT Evaluation     Today's date: 2018  Patient name: Rosette Trejo  : 1955  MRN: [id number]  Referring provider: Tammy Lorenzo DPM  Dx:   Encounter Diagnosis     ICD-10-CM    1  Left foot pain M79 672                   Assessment  Impairments: abnormal gait, abnormal muscle tone, abnormal or restricted ROM, impaired balance, impaired physical strength, pain with function and weight-bearing intolerance    Assessment details: Patient presents with signs and symptoms consistent with referring diagnosis  Positive prognostic indicators include: Motivated to improve  Negative prognostic indicators include: (-) She presents with: pain, decreased: ROM, strength and  functional capacity as well as altered gait and mobility  She requires skilled PT to address these deficits and restore maximal functional capacity  Thank you for this pleasant referral        Understanding of Dx/Px/POC: good   Prognosis: good    Goals  ST-6 weeks  1  Patient to be independent with HEP  2   Decrease pain at least 2 subjective levels  LT-12 weeks      1    Patient to voice comfort with self management of condition  2   75% or > decreased pain  3   75% or > decreased functional deficits  4   Normalize AROM of all deficit planes  5   Normalize strength  6   Functional Status Score: 77  7  Patient to voice understanding of activities/positions to avoid  9   Normalize Gait    Plan  Patient would benefit from: skilled PT  Referral necessary: No  Planned modality interventions: cryotherapy  Planned therapy interventions: IADL retraining, joint mobilization, manual therapy, motor coordination training, neuromuscular re-education, patient education, postural training, self care, strengthening, stretching, therapeutic activities, therapeutic exercise, home exercise program, flexibility, ADL training, balance and body mechanics training  Frequency: 2x week  Duration in weeks: 6  Treatment plan discussed with: patient        Subjective Evaluation    History of Present Illness  Date of onset: 2018  Date of surgery: 2018  Mechanism of injury: Chief Complaint:  Limited mobility    History:  Pt suffered L peroneus longus/brevis tears while climbing out of a 5 foot trench  She heard a pop nan had pain  She was seen by her referring physician, had an MRI  She had surgery for peroneus longer/brevis  She was placed in a CAM boot post surgery and used AC until last week  She has been released for transition to a sneaker  She works as an  with sitting job duties  She enjoys exercise classes, yoga, yardwork and walking her dogs        PMH: fractured both ankles,      Aggravating factors: walking on uneven ground barefoot  Relieving factors: rest    Functional Deficits: WB > 20 minutes, step to on stairs    Patient Goals: return to yoga and exercise clases    Quality of life: good    Pain  Current pain ratin  At best pain ratin  At worst pain rating: 3  Location: Lateral ankle          Objective     Active Range of Motion   Left Ankle/Foot   Dorsiflexion (ke): 0 degrees   Dorsiflexion (kf): 0 degrees   Plantar flexion: 45 degrees   Inversion: 20 degrees   Eversion: 10 degrees   Great toe extension: 45 degrees     Right Ankle/Foot   Dorsiflexion (ke): 8 degrees   Dorsiflexion (kf): 8 degrees     Passive Range of Motion   Left Ankle/Foot    Dorsiflexion (ke): 5 degrees   Dorsiflexion (kf): 5 degrees   Plantar flexion: 50 degrees   Inversion: 25 degrees   Eversion: 15 degrees     Strength/Myotome Testing     Left Ankle/Foot   Dorsiflexion: 3+  Plantar flexion: 3  Inversion: 3+  Eversion: 3+  Great toe extension: 3+    General Comments     Ankle/Foot Comments   Girth:     Ankle: L 28; R 27    Figure 8: L 62 5; L 62  Incision healing, no signs of infection  Rubor on dependency  Neuromotor exam WFL  Weight Shift up to 150 lbs  Gait: CAM boot, ambulates on R tiptoe  Steps: Step to pattern, B handrail support            Precautions: CAM Boot- may transition to sneaker    Daily Treatment Diary       Manuals 9/25         PROM                                                   Exercise Diary          HEP 10'         PWB TM          Stair training          Seated HR/TR          PWB Stand HR/TR          PWB Standing Wobble Bds All way          Ankle Tband 4 way          Foot shortening exercise          SLS                                                                                                                                  Modalities          CP prn

## 2018-09-25 NOTE — PROGRESS NOTES
PT Evaluation     Today's date: 2018  Patient name: Rosette Trejo  : 1955  MRN: [id number]  Referring provider: Tammy Lorenzo DPM  Dx:   Encounter Diagnosis     ICD-10-CM    1  Left foot pain M79 672                   Assessment  Impairments: abnormal gait, abnormal muscle tone, abnormal or restricted ROM, impaired balance, impaired physical strength, pain with function and weight-bearing intolerance    Assessment details: Patient presents with signs and symptoms consistent with referring diagnosis  Positive prognostic indicators include: Motivated to improve  Negative prognostic indicators include: (-) She presents with: pain, decreased: ROM, strength and  functional capacity as well as altered gait and mobility  She requires skilled PT to address these deficits and restore maximal functional capacity  Thank you for this pleasant referral        Understanding of Dx/Px/POC: good   Prognosis: good    Goals  ST-6 weeks  1  Patient to be independent with HEP  2   Decrease pain at least 2 subjective levels  LT-12 weeks  1    Patient to voice comfort with self management of condition  2   75% or > decreased pain  3   75% or > decreased functional deficits  4   Normalize AROM of all deficit planes  5   Normalize strength  6   Functional Status Score: 77  7  Patient to voice understanding of activities/positions to avoid    9   Normalize Gait    Plan  Patient would benefit from: skilled PT  Referral necessary: No  Planned modality interventions: cryotherapy  Planned therapy interventions: IADL retraining, joint mobilization, manual therapy, motor coordination training, neuromuscular re-education, patient education, postural training, self care, strengthening, stretching, therapeutic activities, therapeutic exercise, home exercise program, flexibility, ADL training, balance and body mechanics training  Frequency: 2x week  Duration in weeks: 6  Treatment plan discussed with: patient        Subjective Evaluation    History of Present Illness  Date of onset: 2018  Date of surgery: 2018  Mechanism of injury: Chief Complaint:  Limited mobility    History:  Pt suffered L peroneus longus/brevis tears while climbing out of a 5 foot trench  She heard a pop nan had pain  She was seen by her referring physician, had an MRI  She had surgery for peroneus longer/brevis  She was placed in a CAM boot post surgery and used AC until last week  She has been released for transition to a sneaker  She works as an  with sitting job duties  She enjoys exercise classes, yoga, yardwork and walking her dogs  PMH: fractured both ankles,      Aggravating factors: walking on uneven ground barefoot  Relieving factors: rest    Functional Deficits: WB > 20 minutes, step to on stairs    Patient Goals: return to yoga and exercise clases    Quality of life: good    Pain  Current pain ratin  At best pain ratin  At worst pain rating: 3  Location: Lateral ankle          Objective     Active Range of Motion   Left Ankle/Foot   Dorsiflexion (ke): 0 degrees   Dorsiflexion (kf): 0 degrees   Plantar flexion: 45 degrees   Inversion: 20 degrees   Eversion: 10 degrees   Great toe extension: 45 degrees     Right Ankle/Foot   Dorsiflexion (ke): 8 degrees   Dorsiflexion (kf): 8 degrees     Passive Range of Motion   Left Ankle/Foot    Dorsiflexion (ke): 5 degrees   Dorsiflexion (kf): 5 degrees   Plantar flexion: 50 degrees   Inversion: 25 degrees   Eversion: 15 degrees     Strength/Myotome Testing     Left Ankle/Foot   Dorsiflexion: 3+  Plantar flexion: 3  Inversion: 3+  Eversion: 3+  Great toe extension: 3+    General Comments     Ankle/Foot Comments   Girth:     Ankle: L 28; R 27    Figure 8: L 62 5; L 62  Incision healing, no signs of infection  Rubor on dependency  Neuromotor exam WFL  Weight Shift up to 150 lbs  Gait: CAM boot, ambulates on R tiptoe  Steps: Step to pattern, B handrail support            Precautions: CAM Boot- may transition to sneaker    Daily Treatment Diary       Manuals 9/25         PROM                                                   Exercise Diary          HEP 10'         PWB TM          Stair training          Seated HR/TR          PWB Stand HR/TR          PWB Standing Wobble Bds All way          Ankle Tband 4 way          Foot shortening exercise          SLS                                                                                                                                  Modalities          CP prn

## 2018-09-27 ENCOUNTER — OFFICE VISIT (OUTPATIENT)
Dept: PHYSICAL THERAPY | Facility: MEDICAL CENTER | Age: 63
End: 2018-09-27
Payer: COMMERCIAL

## 2018-09-27 DIAGNOSIS — M79.672 LEFT FOOT PAIN: Primary | ICD-10-CM

## 2018-09-27 PROCEDURE — 97112 NEUROMUSCULAR REEDUCATION: CPT | Performed by: PHYSICAL THERAPIST

## 2018-09-27 PROCEDURE — 97140 MANUAL THERAPY 1/> REGIONS: CPT | Performed by: PHYSICAL THERAPIST

## 2018-09-27 PROCEDURE — 97110 THERAPEUTIC EXERCISES: CPT | Performed by: PHYSICAL THERAPIST

## 2018-09-27 NOTE — PROGRESS NOTES
Daily Note     Today's date: 2018  Patient name: Tania Santa  : 1955  MRN: [id number]  Referring provider: Jayden Newell DPM  Dx:   Encounter Diagnosis     ICD-10-CM    1  Left foot pain M79 672                   Subjective: Patient stated no significant pain prior to treatment session  Objective: See treatment diary below  Precautions: CAM Boot- may transition to sneaker     Daily Treatment Diary         Manuals              PROM    KK                                                                                     Exercise Diary                 HEP 10'               PWB TM    6' full             Stair training                 Seated HR/TR                 PWB Stand HR/TR    3x10             PWB Standing Wobble Bds All way    30 ea              Ankle Tband 4 way    green 2x10 ea             Foot shortening exercise    30x             SLS                                                                                                                                                                                                                                         Modalities                 CP prn                                           Assessment: Patient reported to PT ambulating with sneaker, stated no significant pain ambulating with sneaker; performing full WB ambulation on treadmill without significant pain; performed all exercises as listed without significant c/o pain; no c/o pain with manual PROM  Plan: Continue per plan of care

## 2018-10-02 ENCOUNTER — OFFICE VISIT (OUTPATIENT)
Dept: PHYSICAL THERAPY | Facility: MEDICAL CENTER | Age: 63
End: 2018-10-02
Payer: COMMERCIAL

## 2018-10-02 DIAGNOSIS — M79.672 LEFT FOOT PAIN: Primary | ICD-10-CM

## 2018-10-02 PROCEDURE — 97140 MANUAL THERAPY 1/> REGIONS: CPT | Performed by: PHYSICAL THERAPIST

## 2018-10-02 PROCEDURE — 97110 THERAPEUTIC EXERCISES: CPT | Performed by: PHYSICAL THERAPIST

## 2018-10-02 NOTE — PROGRESS NOTES
Daily Note     Today's date: 10/2/2018  Patient name: Haydee Rondon  : 1955  MRN: [id number]  Referring provider: Can Jackson DPM  Dx:   Encounter Diagnosis     ICD-10-CM    1  Left foot pain M79 672                   Subjective: Pt compliant with HEP      Objective: See treatment diary below  Updated HEP- Tband 4 way    Assessment: Tolerated treatment well  Patient would benefit from continued PT      Plan: Continue per plan of care       Precautions: CAM Boot- may transition to sneaker     Daily Treatment Diary         Manuals 9/25  9/27  10/3           PROM    KK  10'                                                                                   Exercise Diary                 HEP 10'               PWB TM    6' full  6' full           Stair training                 Seated HR/TR                 PWB Stand HR/TR    3x10  20x           PWB Standing Wobble Bds All way    30 ea   30x FWB DL           Ankle Tband 4 way    green 2x10 ea  GTB :03 20           Foot shortening exercise    30x :05/20x           SLS EC     10 :10            SLS TB Pulls                                                                                                                                                                                                                       Modalities                 CP prn                                     Direct Supervision [id number]

## 2018-10-04 ENCOUNTER — OFFICE VISIT (OUTPATIENT)
Dept: PHYSICAL THERAPY | Facility: MEDICAL CENTER | Age: 63
End: 2018-10-04
Payer: COMMERCIAL

## 2018-10-04 DIAGNOSIS — M79.672 LEFT FOOT PAIN: Primary | ICD-10-CM

## 2018-10-04 PROCEDURE — 97140 MANUAL THERAPY 1/> REGIONS: CPT | Performed by: PHYSICAL THERAPIST

## 2018-10-04 PROCEDURE — 97110 THERAPEUTIC EXERCISES: CPT | Performed by: PHYSICAL THERAPIST

## 2018-10-04 NOTE — PROGRESS NOTES
Daily Note     Today's date: 10/4/2018  Patient name: Ankit Sommers  : 1955  MRN: [id number]  Referring provider: Jorge Luis Montiel DPM  Dx:   Encounter Diagnosis     ICD-10-CM    1  Left foot pain M79 672                   Subjective: Denies soreness after LV, less swelling in the morning vs afternoon      Objective: See treatment diary below      Assessment: Tolerated treatment well  Patient would benefit from continued PT  Pt arrived on wrong day, was accomodated    Plan: Continue per plan of care       Precautions: CAM Boot- may transition to sneaker     Daily Treatment Diary         Manuals 9/25  9/27  10/2  10/4         PROM    KK  10'  10'                                                                                 Exercise Diary                 HEP 8'               PWB TM    6' full  6' full  6' FWB         Stair training        5x         Seated HR/TR                 PWB Stand HR/TR    3x10  20x  20x         PWB Standing Wobble Bds All way    30 ea   30x FWB DL  30x FWB DL, PWB SL         Ankle Tband 4 way    green 2x10 ea  GTB :03 20  GTB :03 20         Foot shortening exercise    30x :05/20x  :05/20         SLS EC     10 :10  :1010          SLS TB Pulls        G 20x all          Bike       6'                                                                                                                                                                                             Modalities                 CP prn                                   Direct Supervision: [phone]

## 2018-10-05 ENCOUNTER — APPOINTMENT (OUTPATIENT)
Dept: PHYSICAL THERAPY | Facility: MEDICAL CENTER | Age: 63
End: 2018-10-05
Payer: COMMERCIAL

## 2018-10-08 ENCOUNTER — OFFICE VISIT (OUTPATIENT)
Dept: PHYSICAL THERAPY | Facility: MEDICAL CENTER | Age: 63
End: 2018-10-08
Payer: COMMERCIAL

## 2018-10-08 DIAGNOSIS — M79.672 LEFT FOOT PAIN: Primary | ICD-10-CM

## 2018-10-08 PROCEDURE — 97140 MANUAL THERAPY 1/> REGIONS: CPT | Performed by: PHYSICAL THERAPIST

## 2018-10-08 PROCEDURE — 97110 THERAPEUTIC EXERCISES: CPT | Performed by: PHYSICAL THERAPIST

## 2018-10-08 PROCEDURE — 97112 NEUROMUSCULAR REEDUCATION: CPT | Performed by: PHYSICAL THERAPIST

## 2018-10-08 NOTE — PROGRESS NOTES
Daily Note     Today's date: 10/8/2018  Patient name: Elpidio Salguero  : 1955  MRN: [id number]  Referring provider: Mango Hooper DPM  Dx:   Encounter Diagnosis     ICD-10-CM    1  Left foot pain M79 672                   Subjective: Patient stated increased pain and swelling over this past weekend of insidious onset; states continued difficulty descending stairs  Objective: See treatment diary below      Assessment: Patient demonstrated moderate difficulty with addition of step downs; positive response to manual intervention  Plan: Continue per plan of care  Precautions: CAM Boot- may transition to sneaker     Daily Treatment Diary         Manuals 9/25  9/27  10/2  10/4  10/8       PROM    KK  10'  10'  KK                                                                               Exercise Diary                 HEP 8'               PWB TM    6' full  6' full  6' FWB  6' FWB       Stair training        5x         Seated HR/TR                 PWB Stand HR/TR    3x10  20x  20x  20x       PWB Standing Wobble Bds All way    30 ea   30x FWB DL  30x FWB DL, PWB SL  30x ea        Ankle Tband 4 way    green 2x10 ea  GTB :03 20  GTB :03 20  BTB :03 x20       Foot shortening exercise    30x :05/20x  :05/20  np       SLS EC     10 :10  :10/10  :10/10        SLS TB Pulls        G 20x all  G 30x ea         Bike       6'  np        Step downs with ecc   control          4" 2x10                                                                                                                                                                         Modalities                 CP prn                                   Direct Supervision: 7:30 - 8:10

## 2018-10-10 ENCOUNTER — OFFICE VISIT (OUTPATIENT)
Dept: PHYSICAL THERAPY | Facility: MEDICAL CENTER | Age: 63
End: 2018-10-10
Payer: COMMERCIAL

## 2018-10-10 DIAGNOSIS — M79.672 LEFT FOOT PAIN: Primary | ICD-10-CM

## 2018-10-10 PROCEDURE — 97110 THERAPEUTIC EXERCISES: CPT | Performed by: PHYSICAL THERAPIST

## 2018-10-10 PROCEDURE — G8990 OTHER PT/OT CURRENT STATUS: HCPCS | Performed by: PHYSICAL THERAPIST

## 2018-10-10 PROCEDURE — 97140 MANUAL THERAPY 1/> REGIONS: CPT | Performed by: PHYSICAL THERAPIST

## 2018-10-10 PROCEDURE — G8991 OTHER PT/OT GOAL STATUS: HCPCS | Performed by: PHYSICAL THERAPIST

## 2018-10-10 PROCEDURE — 97112 NEUROMUSCULAR REEDUCATION: CPT | Performed by: PHYSICAL THERAPIST

## 2018-10-10 NOTE — PROGRESS NOTES
Daily Note     Today's date: 10/10/2018  Patient name: Jorden Norris  : 1955  MRN: [id number]  Referring provider: Johan Mueller DPM  Dx:   Encounter Diagnosis     ICD-10-CM    1  Left foot pain M79 672                   Subjective: Pt feels pressure/pinching in ankle especially with squatting/stairs      Objective: See treatment diary below  Pt ed in HEP including new belt MWM to improve ankle DF    Assessment: Tolerated treatment well  Patient would benefit from continued PT  Less pain with squatting post MT      Plan: Continue per plan of care  Precautions: CAM Boot- may transition to sneaker     Daily Treatment Diary         Manuals 9/25  9/27  10/2  10/4  10/8  10/10     PROM    KK  10'  10'  KK        Belt MWM for DF concurrent with gastroc/sol str           10'                                                           Exercise Diary                 HEP 10'               PWB TM    6' full  6' full  6' FWB  6' FWB  6'FWB     Stair training        5x         Seated HR/TR                 PWB Stand HR/TR    3x10  20x  20x  20x  20x FWB     PWB Standing Wobble Bds All way    30 ea   30x FWB DL  30x FWB DL, PWB SL  30x ea   FWB 30x ea     Ankle Tband 4 way    green 2x10 ea  GTB :03 20  GTB :03 20  BTB :03 x20  GTB :03 20     Foot shortening exercise    30x :05/20x  :05/20  np       SLS EC     10 :10  :10/10  :10/10  :10/10      SLS TB Pulls        G 20x all  G 30x ea   G 30x ea      Bike       6'  np        Step downs with ecc   control          4" 2x10  4'' 20                                                                                                                                                                       Modalities                 CP prn

## 2018-10-15 ENCOUNTER — OFFICE VISIT (OUTPATIENT)
Dept: PHYSICAL THERAPY | Facility: MEDICAL CENTER | Age: 63
End: 2018-10-15
Payer: COMMERCIAL

## 2018-10-15 DIAGNOSIS — M79.672 LEFT FOOT PAIN: Primary | ICD-10-CM

## 2018-10-15 PROCEDURE — 97112 NEUROMUSCULAR REEDUCATION: CPT | Performed by: PHYSICAL MEDICINE & REHABILITATION

## 2018-10-15 PROCEDURE — 97140 MANUAL THERAPY 1/> REGIONS: CPT | Performed by: PHYSICAL MEDICINE & REHABILITATION

## 2018-10-15 NOTE — PROGRESS NOTES
Daily Note     Today's date: 10/15/2018  Patient name: Gregorio Gar  : 1955  MRN: [id number]  Referring provider: Yareli Alba DPM  Dx:   Encounter Diagnosis     ICD-10-CM    1  Left foot pain M79 672                   Subjective: Patient presents without change in status, generally without complaint aside from dorsal foot pain  Objective: See treatment diary below     Assessment: Tolerated treatment well  Patient would benefit from continued PT  Plan: Continue per plan of care  Precautions: CAM Boot- may transition to sneaker     Daily Treatment Diary         Manuals 9/25  9/27  10/2  10/4  10/8  10/10 10/15   PROM    KK  10'  10'  KK   [address]    Belt MWM for DF concurrent with gastroc/sol str           10'                                                           Exercise Diary              10/15   HEP 10'               PWB TM    6' full  6' full  6' FWB  6' FWB  6'FWB  np   Stair training        5x         Seated HR/TR                 PWB Stand HR/TR    3x10  20x  20x  20x  20x FWB  20x ea   PWB Standing Wobble Bds All way    30 ea   30x FWB DL  30x FWB DL, PWB SL  30x ea   FWB 30x ea  30x ea, SL   Ankle Tband 4 way    green 2x10 ea  GTB :03 20  GTB :03 20  BTB :03 x20  GTB :03 20     Foot shortening exercise    30x :05/20x  :05/20  np    ---   SLS EC     10 :10  :10/10  :10/10  :10/10  10x10"    SLS TB Pulls        G 20x all  G 30x ea   G 30x ea  GTB 30x ea    Bike       6'  np    6'    Step downs with ecc   control          4" 2x10  4'' 20  20x4"                                                                                                                                                                     Modalities                 CP prn

## 2018-10-17 ENCOUNTER — OFFICE VISIT (OUTPATIENT)
Dept: PHYSICAL THERAPY | Facility: MEDICAL CENTER | Age: 63
End: 2018-10-17
Payer: COMMERCIAL

## 2018-10-17 DIAGNOSIS — M79.672 LEFT FOOT PAIN: Primary | ICD-10-CM

## 2018-10-17 PROCEDURE — 97110 THERAPEUTIC EXERCISES: CPT | Performed by: PHYSICAL THERAPIST

## 2018-10-17 PROCEDURE — 97140 MANUAL THERAPY 1/> REGIONS: CPT | Performed by: PHYSICAL THERAPIST

## 2018-10-17 PROCEDURE — 97112 NEUROMUSCULAR REEDUCATION: CPT | Performed by: PHYSICAL THERAPIST

## 2018-10-17 NOTE — LETTER
2018    [facility] [employer]  [address]  [address]    Patient: Ilir Watts   YOB: 1955   Date of Visit: 10/17/2018     Encounter Diagnosis     ICD-10-CM    1  Left foot pain M79 672        Dear Dr Perkins Liter:    Please review the attached Plan of Care from [facility] recent visit  Please verify that you agree therapy should continue by signing the attached document and sending it back to our office  If you have any questions or concerns, please don't hesitate to call  Sincerely,    Donnelly Closs, PT      Referring Provider:      I certify that I have read the below Plan of Care and certify the need for these services furnished under this plan of treatment while under my care  Fuad Coburn [address]  [address] [place]: [phone]          PT Re-Evaluation     Today's date: 10/17/2018  Patient name: Ilir Watts  : 1955  MRN: [id number]  Referring provider: Brenda Mac DPM  Dx:   Encounter Diagnosis     ICD-10-CM    1  Left foot pain M79 672                   Assessment  Impairments: abnormal gait, abnormal muscle tone, abnormal or restricted ROM, impaired balance, impaired physical strength and pain with function    Assessment details: Pt has attended 8 visits of skilled PT over the past 4 weeks  Pt states she has less difficulty with ascend/descend stairs, improved weight bearing tolerance and ankle mobility, and decreased pain  Pt states ankle cont to swell and has pain in new location on dorsum of L foot  Upon reassessment, pt demonstrates steady gains in L ankle strength and ROM, however mod DF ROM limitations, decreased eversion and PF strength, and swelling persists  Pt will cont to benefit from cont PT to address the deficits listed above  Thank you for your referral   Understanding of Dx/Px/POC: good   Prognosis: good    Goals  ST-6 weeks  1    Patient to be independent with HEP  -met as of 10/17  2  Decrease pain at least 2 subjective levels  -partially met as of 10/17    LT-12 weeks  1    Patient to voice comfort with self management of condition  2   75% or > decreased pain  3   75% or > decreased functional deficits  4   Normalize AROM of all deficit planes  5   Normalize strength  6   Functional Status Score: 77  7  Patient to voice understanding of activities/positions to avoid    9   Normalize Gait    Plan  Patient would benefit from: skilled PT  Referral necessary: No  Planned modality interventions: cryotherapy  Planned therapy interventions: IADL retraining, joint mobilization, manual therapy, motor coordination training, neuromuscular re-education, patient education, postural training, self care, strengthening, stretching, therapeutic activities, therapeutic exercise, home exercise program, flexibility, ADL training, balance and body mechanics training  Frequency: 2x week  Duration in visits: 12  Duration in weeks: 6  Plan of Care beginning date: 10/17/2018  Plan of Care expiration date: 2018  Treatment plan discussed with: patient        Subjective Evaluation    History of Present Illness  Date of onset: 2018  Date of surgery: 2018  Mechanism of injury: Chief Complaint:  Limited mobility, swelling, and pain in dorsum of L forefoot    PMH: fractured both ankles,      Aggravating factors: walking on uneven ground barefoot, descending stairs, walking her dogs    Relieving factors: rest    Functional Deficits: WB > 30 minutes,     Patient Goals: return to yoga and exercise clases    Quality of life: good    Pain  Current pain ratin  At best pain ratin  At worst pain ratin  Location: Lateral ankle    Patient Goals  Patient goals for therapy: decreased pain, decreased edema, return to sport/leisure activities, independence with ADLs/IADLs, increased strength and increased motion          Objective     Active Range of Motion   Left Ankle/Foot   Dorsiflexion (ke): 4 degrees   Plantar flexion: 50 degrees   Inversion: 24 degrees   Eversion: 10 degrees with pain    Passive Range of Motion   Left Ankle/Foot    Dorsiflexion (ke): 8 degrees   Plantar flexion: 50 degrees   Inversion: 30 degrees   Eversion: 20 degrees     Strength/Myotome Testing     Left Ankle/Foot   Dorsiflexion: 4+  Plantar flexion: 4-  Inversion: 4-  Eversion: 4-    General Comments     Ankle/Foot Comments   Girth: Ankle: L 28 5cm    Figure 8: L 55cm   Incision site well healed   Gait: Pt amb ind without AD and wearing sneaker, decreased step length and push-off observed on L              Precautions: CAM Boot- may transition to sneaker    Daily Treatment Diary   Daily Treatment Diary         Manuals 9/25  9/27  10/2  10/4  10/8  10/10 10/17   PROM/joint mobs    KK  10'  10'  KK    x10'    Belt MWM for DF concurrent with gastroc/sol str           10'                                                           Exercise Diary              10/17   HEP 10'               PWB TM    6' full  6' full  6' FWB  6' FWB  6'FWB  np   Stair training        5x         Seated HR/TR                 PWB Stand HR/TR    3x10  20x  20x  20x  20x FWB  20x ea   PWB Standing Wobble Bds All way    30 ea   30x FWB DL  30x FWB DL, PWB SL  30x ea   FWB 30x ea  30x ea, SL   Ankle Tband 4 way    green 2x10 ea  GTB :03 20  GTB :03 20  BTB :03 x20  GTB :03 20     Foot shortening exercise    30x :05/20x  :05/20  np    ---   SLS EC     10 :10  :10/10  :10/10  :10/10  10x10"    SLS TB Pulls        G 20x all  G 30x ea   G 30x ea  GTB 30x ea    Bike       6'  np    6'    Step downs with ecc   control          4" 2x10  4'' 20  20x4"    DF strap stretch              20"x4                                                                                                                                                   Modalities                 CP prn

## 2018-10-17 NOTE — PROGRESS NOTES
PT Re-Evaluation     Today's date: 10/17/2018  Patient name: Lexi Hansen  : 1955  MRN: [id number]  Referring provider: Uma Issa DPM  Dx:   Encounter Diagnosis     ICD-10-CM    1  Left foot pain M79 672                   Assessment  Impairments: abnormal gait, abnormal muscle tone, abnormal or restricted ROM, impaired balance, impaired physical strength and pain with function    Assessment details: Pt has attended 8 visits of skilled PT over the past 4 weeks  Pt states she has less difficulty with ascend/descend stairs, improved weight bearing tolerance and ankle mobility, and decreased pain  Pt states ankle cont to swell and has pain in new location on dorsum of L foot  Upon reassessment, pt demonstrates steady gains in L ankle strength and ROM, however mod DF ROM limitations, decreased eversion and PF strength, and swelling persists  Pt will cont to benefit from cont PT to address the deficits listed above  Thank you for your referral   Understanding of Dx/Px/POC: good   Prognosis: good    Goals  ST-6 weeks  1  Patient to be independent with HEP  -met as of 10/17  2  Decrease pain at least 2 subjective levels  -partially met as of 10/17    LT-12 weeks  1    Patient to voice comfort with self management of condition  2   75% or > decreased pain  3   75% or > decreased functional deficits  4   Normalize AROM of all deficit planes  5   Normalize strength  6   Functional Status Score: 77  7  Patient to voice understanding of activities/positions to avoid    9   Normalize Gait    Plan  Patient would benefit from: skilled PT  Referral necessary: No  Planned modality interventions: cryotherapy  Planned therapy interventions: IADL retraining, joint mobilization, manual therapy, motor coordination training, neuromuscular re-education, patient education, postural training, self care, strengthening, stretching, therapeutic activities, therapeutic exercise, home exercise program, flexibility, ADL training, balance and body mechanics training  Frequency: 2x week  Duration in visits: 12  Duration in weeks: 6  Plan of Care beginning date: 10/17/2018  Plan of Care expiration date: 2018  Treatment plan discussed with: patient        Subjective Evaluation    History of Present Illness  Date of onset: 2018  Date of surgery: 2018  Mechanism of injury: Chief Complaint:  Limited mobility, swelling, and pain in dorsum of L forefoot    PMH: fractured both ankles,      Aggravating factors: walking on uneven ground barefoot, descending stairs, walking her dogs    Relieving factors: rest    Functional Deficits: WB > 30 minutes,     Patient Goals: return to yoga and exercise clases    Quality of life: good    Pain  Current pain ratin  At best pain ratin  At worst pain ratin  Location: Lateral ankle    Patient Goals  Patient goals for therapy: decreased pain, decreased edema, return to sport/leisure activities, independence with ADLs/IADLs, increased strength and increased motion          Objective     Active Range of Motion   Left Ankle/Foot   Dorsiflexion (ke): 4 degrees   Plantar flexion: 50 degrees   Inversion: 24 degrees   Eversion: 10 degrees with pain    Passive Range of Motion   Left Ankle/Foot    Dorsiflexion (ke): 8 degrees   Plantar flexion: 50 degrees   Inversion: 30 degrees   Eversion: 20 degrees     Strength/Myotome Testing     Left Ankle/Foot   Dorsiflexion: 4+  Plantar flexion: 4-  Inversion: 4-  Eversion: 4-    General Comments     Ankle/Foot Comments   Girth:     Ankle: L 28 5cm    Figure 8: L 55cm   Incision site well healed   Gait: Pt amb ind without AD and wearing sneaker, decreased step length and push-off observed on L              Precautions: CAM Boot- may transition to sneaker    Daily Treatment Diary   Daily Treatment Diary         Manuals 9/25  9/27  10/2  10/4  10/8  10/10 10/17   PROM/joint mobs    KK  10'  10'  KK    x10'    Belt MWM for DF concurrent with gastroc/sol str           10'                                                           Exercise Diary              10/17   HEP 10'               PWB TM    6' full  6' full  6' FWB  6' FWB  6'FWB  np   Stair training        5x         Seated HR/TR                 PWB Stand HR/TR    3x10  20x  20x  20x  20x FWB  20x ea   PWB Standing Wobble Bds All way    30 ea   30x FWB DL  30x FWB DL, PWB SL  30x ea   FWB 30x ea  30x ea, SL   Ankle Tband 4 way    green 2x10 ea  GTB :03 20  GTB :03 20  BTB :03 x20  GTB :03 20     Foot shortening exercise    30x :05/20x  :05/20  np    ---   SLS EC     10 :10  :10/10  :10/10  :10/10  10x10"    SLS TB Pulls        G 20x all  G 30x ea   G 30x ea  GTB 30x ea    Bike       6'  np    6'    Step downs with ecc   control          4" 2x10  4'' 20  20x4"    DF strap stretch              20"x4                                                                                                                                                   Modalities                 CP prn

## 2018-10-18 ENCOUNTER — HOSPITAL ENCOUNTER (OUTPATIENT)
Dept: RADIOLOGY | Facility: MEDICAL CENTER | Age: 63
Discharge: HOME | End: 2018-10-18
Payer: COMMERCIAL

## 2018-10-18 DIAGNOSIS — R92.2 DENSE BREAST: ICD-10-CM

## 2018-10-18 DIAGNOSIS — M81.0 OSTEOPOROSIS, UNSPECIFIED OSTEOPOROSIS TYPE, UNSPECIFIED PATHOLOGICAL FRACTURE PRESENCE: ICD-10-CM

## 2018-10-18 DIAGNOSIS — Z12.31 ENCOUNTER FOR SCREENING MAMMOGRAM FOR MALIGNANT NEOPLASM OF BREAST: ICD-10-CM

## 2018-10-18 PROCEDURE — 77067 SCR MAMMO BI INCL CAD: CPT

## 2018-10-18 PROCEDURE — 77080 DXA BONE DENSITY AXIAL: CPT

## 2018-10-18 PROCEDURE — 77063 BREAST TOMOSYNTHESIS BI: CPT

## 2018-10-19 ENCOUNTER — TRANSCRIBE ORDERS (OUTPATIENT)
Dept: ADMINISTRATIVE | Facility: HOSPITAL | Age: 63
End: 2018-10-19

## 2018-10-19 ENCOUNTER — HOSPITAL ENCOUNTER (OUTPATIENT)
Dept: RADIOLOGY | Facility: IMAGING CENTER | Age: 63
Discharge: HOME | End: 2018-10-19
Payer: COMMERCIAL

## 2018-10-19 DIAGNOSIS — M79.672 LEFT FOOT PAIN: ICD-10-CM

## 2018-10-19 DIAGNOSIS — M79.672 LEFT FOOT PAIN: Primary | ICD-10-CM

## 2018-10-19 PROCEDURE — 73630 X-RAY EXAM OF FOOT: CPT

## 2018-10-22 ENCOUNTER — OFFICE VISIT (OUTPATIENT)
Dept: PHYSICAL THERAPY | Facility: MEDICAL CENTER | Age: 63
End: 2018-10-22
Payer: COMMERCIAL

## 2018-10-22 DIAGNOSIS — M79.672 LEFT FOOT PAIN: Primary | ICD-10-CM

## 2018-10-22 PROCEDURE — 97110 THERAPEUTIC EXERCISES: CPT | Performed by: PHYSICAL THERAPIST

## 2018-10-22 PROCEDURE — 97140 MANUAL THERAPY 1/> REGIONS: CPT | Performed by: PHYSICAL THERAPIST

## 2018-10-22 NOTE — PROGRESS NOTES
Daily Note     Today's date: 10/22/2018  Patient name: Ester Doctor  : 1955  MRN: [id number]  Referring provider: Megan Hill DPM  Dx:   Encounter Diagnosis     ICD-10-CM    1  Left foot pain M79 672        Start Time: 1208  Stop Time: 817  Total time in clinic (min): 44 minutes    Subjective: Patient reports that she is currently having pain of 3/10 on the top of her left foot  She states that this is exacerbated with putting on her shoe and walking for a prolonged period of time  Objective: See treatment diary below  Daily Treatment Diary   Daily Treatment Diary         Manuals 10/22  9/27  10/2  10/4  10/8  10/10 10/17   PROM/joint mobs    KK  10'  10'  KK    x10'    Belt MWM for DF concurrent with gastroc/sol str           10'      PROM  TK 10'                                                    Exercise Diary              10/17   HEP                PWB TM    6' full  6' full  6' FWB  6' FWB  6'FWB  np   Stair training        5x         Seated HR/TR                 PWB Stand HR/TR  20x ea  3x10  20x  20x  20x  20x FWB  20x ea   PWB Standing Wobble Bds All way 30 ea   30 ea   30x FWB DL  30x FWB DL, PWB SL  30x ea   FWB 30x ea  30x ea, SL   Ankle Tband 4 way  BTB :03 x20  green 2x10 ea  GTB :03 20  GTB :03 20  BTB :03 x20  GTB :03 20     Foot shortening exercise    30x :05/20x  :05/20  np    ---   SLS EC  10x10"   10 :10  :10/10  :10/10  :10/10  10x10"    SLS TB Pulls  GTB 30x ea      G 20x all  G 30x ea   G 30x ea  GTB 30x ea   Kerry Stephaniater'     6'  np    6'    Step downs with ecc  control  20x4"        4" 2x10  4'' 20  20x4"    DF strap stretch  20x4"            20"x4                                                                                                                                                   Modalities                 CP prn                                          Assessment:  Patient demonstrating decreased right ankle DR ROM noted during manual PROM   This is contributing to decreased tolerance to ambulation and descending stairs   Patient demonstrated difficulty with single leg balance activities on right LE  She will continue to benefit from PT in order to improve right ankle DF ROM and balance  Plan: Continue per plan of care

## 2018-10-24 ENCOUNTER — APPOINTMENT (OUTPATIENT)
Dept: PHYSICAL THERAPY | Facility: MEDICAL CENTER | Age: 63
End: 2018-10-24
Payer: COMMERCIAL

## 2018-10-25 ENCOUNTER — APPOINTMENT (OUTPATIENT)
Dept: PHYSICAL THERAPY | Facility: MEDICAL CENTER | Age: 63
End: 2018-10-25
Payer: COMMERCIAL

## 2018-10-29 ENCOUNTER — APPOINTMENT (OUTPATIENT)
Dept: PHYSICAL THERAPY | Facility: MEDICAL CENTER | Age: 63
End: 2018-10-29
Payer: COMMERCIAL

## 2018-10-30 ENCOUNTER — TELEPHONE (OUTPATIENT)
Dept: OBGYN CLINIC | Facility: CLINIC | Age: 63
End: 2018-10-30

## 2018-10-30 DIAGNOSIS — M81.0 OSTEOPOROSIS, UNSPECIFIED OSTEOPOROSIS TYPE, UNSPECIFIED PATHOLOGICAL FRACTURE PRESENCE: Primary | ICD-10-CM

## 2018-10-30 NOTE — TELEPHONE ENCOUNTER
Patient returned call - she is aware of DEXA results and that she needs to take Vit D and Calcium supplements as well as weight bearing exercise  She has not had any recent Vit D drawn b/w done - order in pt chart  Will have done soon  Inquired about if interested in starting treatment with Fosamax or Boniva  Explained how each one is taken - stated she will research it first and get back to us

## 2018-10-30 NOTE — TELEPHONE ENCOUNTER
----- Message from Emi Kiran MD sent at 10/29/2018  6:49 PM EDT -----  Please call Diana Kahn - her DEXA scan shows osteoporosis in her left hip  All the other measurements have decreased since her last DEXA as well  Has she had a vitamin D level drawn recently? We need to optimize her calcium  And vitamin D supplementation  1200 for calcium and at least 1000 per day of vitamin D if not deficient  Exercise is key for strengthening her bones also  She is a candidate for treatments for osteoporosis if interested - Fosamax (once per week) or Boniva (once per month) are usual first line medications if she is interested  There is an injectable medication called Prolia that she may be a candidate for but her insurance may not cover it as first line treatment  This is given through a rheumatologist's office

## 2018-10-31 ENCOUNTER — APPOINTMENT (OUTPATIENT)
Dept: PHYSICAL THERAPY | Facility: MEDICAL CENTER | Age: 63
End: 2018-10-31
Payer: COMMERCIAL

## 2019-06-13 ENCOUNTER — OFFICE VISIT (OUTPATIENT)
Dept: FAMILY MEDICINE CLINIC | Facility: CLINIC | Age: 64
End: 2019-06-13
Payer: COMMERCIAL

## 2019-06-13 VITALS
SYSTOLIC BLOOD PRESSURE: 142 MMHG | HEART RATE: 86 BPM | DIASTOLIC BLOOD PRESSURE: 72 MMHG | OXYGEN SATURATION: 97 % | TEMPERATURE: 99.4 F | RESPIRATION RATE: 17 BRPM

## 2019-06-13 DIAGNOSIS — H92.03 EAR PAIN, BILATERAL: ICD-10-CM

## 2019-06-13 DIAGNOSIS — J02.9 PHARYNGITIS, UNSPECIFIED ETIOLOGY: Primary | ICD-10-CM

## 2019-06-13 LAB — S PYO AG THROAT QL: NEGATIVE

## 2019-06-13 PROCEDURE — 87880 STREP A ASSAY W/OPTIC: CPT | Performed by: FAMILY MEDICINE

## 2019-06-13 PROCEDURE — 99213 OFFICE O/P EST LOW 20 MIN: CPT | Performed by: FAMILY MEDICINE

## 2019-06-13 PROCEDURE — 1160F RVW MEDS BY RX/DR IN RCRD: CPT | Performed by: FAMILY MEDICINE

## 2019-06-13 RX ORDER — AZITHROMYCIN 250 MG/1
TABLET, FILM COATED ORAL
Qty: 10 TABLET | Refills: 0 | Status: SHIPPED | OUTPATIENT
Start: 2019-06-13 | End: 2019-06-17

## 2019-09-24 ENCOUNTER — ANNUAL EXAM (OUTPATIENT)
Dept: OBGYN CLINIC | Facility: CLINIC | Age: 64
End: 2019-09-24
Payer: COMMERCIAL

## 2019-09-24 VITALS
WEIGHT: 180 LBS | BODY MASS INDEX: 26.66 KG/M2 | SYSTOLIC BLOOD PRESSURE: 132 MMHG | HEIGHT: 69 IN | DIASTOLIC BLOOD PRESSURE: 78 MMHG

## 2019-09-24 DIAGNOSIS — Z12.31 ENCOUNTER FOR SCREENING MAMMOGRAM FOR MALIGNANT NEOPLASM OF BREAST: ICD-10-CM

## 2019-09-24 DIAGNOSIS — Z01.419 ENCOUNTER FOR GYNECOLOGICAL EXAMINATION WITHOUT ABNORMAL FINDING: Primary | ICD-10-CM

## 2019-09-24 DIAGNOSIS — R92.2 DENSE BREAST: ICD-10-CM

## 2019-09-24 PROCEDURE — S0612 ANNUAL GYNECOLOGICAL EXAMINA: HCPCS | Performed by: OBSTETRICS & GYNECOLOGY

## 2019-09-24 NOTE — PROGRESS NOTES
Assessment/Plan:    Encounter for gynecological examination without abnormal finding  Pap/HPV not indicated  Mammogram ordered  Colonoscopy declined  DEXA Osteopenia    Encourage healthy diet, exercise, Calcium 1200mg per day and at least 800 iu Vitamin D daily  Diagnoses and all orders for this visit:    Encounter for gynecological examination without abnormal finding    Encounter for screening mammogram for malignant neoplasm of breast  -     Mammo screening bilateral w 3d & cad; Future    Dense breast  -     Mammo screening bilateral w 3d & cad; Future          Subjective:      Patient ID: Gissel Hernandez is a 75 y o  female  Patient here for yearly exam  No current complaints at this time  Age of first period: 13years old   (1miscarriage)  Lmp: TLH   Last mammo: 10/18/118 dense breast BR2 benign (3d)  Colonoscopy: never states she will not get done  Dexa: 10/18/18 osteoporosis (due )  Patient is not a smoker  Patient is not a drinker  Patient exercises  Taking [employer] room dancing with her   Also does yoga and is very active  Planning on retiring this year  Gynecologic Exam   The patient's pertinent negatives include no genital itching, genital lesions, genital odor, genital rash, pelvic pain, vaginal bleeding or vaginal discharge  The patient is experiencing no pain  Pertinent negatives include no chills, constipation, diarrhea, fever, nausea, sore throat or vomiting  The following portions of the patient's history were reviewed and updated as appropriate:   She  has a past medical history of Rectocele    She   Patient Active Problem List    Diagnosis Date Noted    Encounter for gynecological examination without abnormal finding 2018    Dense breasts 2017    Elevated CPK 2016    Numbness of right hand 2016    Depressed mood 2015    Osteoarthritis of hand 2014    Trochanteric bursitis of right hip 2014    Rosacea 08/14/2014    Localized osteoporosis 03/31/2014    Mixed hyperlipidemia 02/08/2012     She  has a past surgical history that includes Colporrhaphy; Breast surgery; Hysterectomy; Repair rectocele; Shoulder arthroscopy w/ rotator cuff repair (12/2014); Tubal ligation; and pr repair/graft flex foot tendon (Left, 7/23/2018)  Her family history includes Heart disease in her family; No Known Problems in her father; Osteoporosis in her family  She  reports that she has never smoked  She has never used smokeless tobacco  She reports that she drinks alcohol  She reports that she does not use drugs  Current Outpatient Medications   Medication Sig Dispense Refill    aspirin (ECOTRIN LOW STRENGTH) 81 mg EC tablet Take 1 tablet (81 mg total) by mouth 2 (two) times a day for 30 days 60 tablet 0     No current facility-administered medications for this visit  Current Outpatient Medications on File Prior to Visit   Medication Sig    aspirin (ECOTRIN LOW STRENGTH) 81 mg EC tablet Take 1 tablet (81 mg total) by mouth 2 (two) times a day for 30 days     No current facility-administered medications on file prior to visit  She is allergic to penicillins       Review of Systems   Constitutional: Negative for activity change, appetite change, chills, fatigue and fever  HENT: Negative for rhinorrhea, sneezing and sore throat  Eyes: Negative for visual disturbance  Respiratory: Negative for cough, shortness of breath and wheezing  Cardiovascular: Negative for chest pain, palpitations and leg swelling  Gastrointestinal: Negative for abdominal distention, constipation, diarrhea, nausea and vomiting  Genitourinary: Negative for difficulty urinating, pelvic pain and vaginal discharge  Neurological: Negative for syncope and light-headedness           Objective:      /78 (BP Location: Right arm, Patient Position: Sitting, Cuff Size: Standard)   Ht 5' 9" (1 753 m)   Wt 81 6 kg (180 lb)   LMP  (LMP Unknown) Breastfeeding? No   BMI 26 58 kg/m²          Physical Exam   Constitutional: She appears well-developed and well-nourished  No distress  Pulmonary/Chest: Right breast exhibits no inverted nipple, no mass, no nipple discharge, no skin change and no tenderness  Left breast exhibits no inverted nipple, no mass, no nipple discharge, no skin change and no tenderness  No breast tenderness, discharge or bleeding  Breasts are symmetrical    Abdominal: Soft  Normal appearance  There is no tenderness  There is no rigidity, no rebound and no guarding  Hernia confirmed negative in the right inguinal area and confirmed negative in the left inguinal area  Genitourinary: No breast tenderness, discharge or bleeding  There is no rash, tenderness, lesion or injury on the right labia  There is no rash, tenderness, lesion or injury on the left labia  Right adnexum displays no mass, no tenderness and no fullness  Left adnexum displays no mass, no tenderness and no fullness  No bleeding in the vagina  No vaginal discharge found  Genitourinary Comments: Urethral meatus: no lesions, non tender  Urethra: non tender    Vaginal mucosa atrophic   Lymphadenopathy:        Right: No inguinal adenopathy present  Left: No inguinal adenopathy present  Skin: Skin is warm and dry  No rash noted  She is not diaphoretic  No erythema  No pallor  Psychiatric: She has a normal mood and affect

## 2019-09-24 NOTE — PATIENT INSTRUCTIONS

## 2019-09-24 NOTE — ASSESSMENT & PLAN NOTE
Pap/HPV not indicated  Mammogram ordered  Colonoscopy declined  DEXA Osteopenia    Encourage healthy diet, exercise, Calcium 1200mg per day and at least 800 iu Vitamin D daily

## 2019-10-08 ENCOUNTER — OFFICE VISIT (OUTPATIENT)
Dept: FAMILY MEDICINE CLINIC | Facility: CLINIC | Age: 64
End: 2019-10-08
Payer: COMMERCIAL

## 2019-10-08 VITALS
TEMPERATURE: 97.8 F | HEART RATE: 77 BPM | SYSTOLIC BLOOD PRESSURE: 129 MMHG | RESPIRATION RATE: 17 BRPM | BODY MASS INDEX: 26.58 KG/M2 | WEIGHT: 180 LBS | DIASTOLIC BLOOD PRESSURE: 78 MMHG | OXYGEN SATURATION: 96 %

## 2019-10-08 DIAGNOSIS — R05.9 COUGH: ICD-10-CM

## 2019-10-08 DIAGNOSIS — J01.00 ACUTE MAXILLARY SINUSITIS, RECURRENCE NOT SPECIFIED: Primary | ICD-10-CM

## 2019-10-08 PROCEDURE — 99213 OFFICE O/P EST LOW 20 MIN: CPT | Performed by: FAMILY MEDICINE

## 2019-10-08 RX ORDER — BENZONATATE 200 MG/1
200 CAPSULE ORAL 3 TIMES DAILY PRN
Qty: 20 CAPSULE | Refills: 0 | Status: SHIPPED | OUTPATIENT
Start: 2019-10-08 | End: 2020-09-28 | Stop reason: ALTCHOICE

## 2019-10-08 RX ORDER — AZITHROMYCIN 250 MG/1
TABLET, FILM COATED ORAL
Qty: 6 TABLET | Refills: 0 | Status: SHIPPED | OUTPATIENT
Start: 2019-10-08 | End: 2019-10-13

## 2019-10-08 NOTE — PROGRESS NOTES
Assessment/Plan:         Diagnoses and all orders for this visit:    Acute maxillary sinusitis, recurrence not specified  -     azithromycin (ZITHROMAX) 250 mg tablet; Take 2 tablets (500 mg total) by mouth daily for 1 day, THEN 1 tablet (250 mg total) daily for 4 days  Cough  -     azithromycin (ZITHROMAX) 250 mg tablet; Take 2 tablets (500 mg total) by mouth daily for 1 day, THEN 1 tablet (250 mg total) daily for 4 days  -     benzonatate (TESSALON) 200 MG capsule; Take 1 capsule (200 mg total) by mouth 3 (three) times a day as needed for cough          Subjective:   Chief Complaint   Patient presents with    Cough     Started about three days ago   Facial Pain    Post Nasal Drip        Patient ID: Arnaldo Canales is a 75 y o  female  Same day sick appt  C/o "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, 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, and came home early yesterday from work," missed today  "Ears feel plugged, teeth hurt"  No meds used- gets side effects to decongestants  Possible ill contact - neighbor with possible pneumonia, pt checks in on her daily      The following portions of the patient's history were reviewed and updated as appropriate: allergies, current medications, past family history, past medical history, past social history, past surgical history and problem list     Review of Systems   Constitutional: Positive for chills and fever  Negative for diaphoresis, fatigue and unexpected weight change  HENT: Positive for sinus pressure  Negative for ear pain, facial swelling, mouth sores, nosebleeds, sore throat and trouble swallowing  Respiratory: Positive for cough  Negative for apnea, choking, chest tightness, shortness of breath, wheezing and stridor  Cardiovascular: Negative  Gastrointestinal: Negative  Skin: Negative  Hematological: Negative            Objective:      /78 (BP Location: Left arm, Patient Position: Sitting, Cuff Size: Standard)   Pulse 77   Temp 97 8 °F (36 6 °C) (Tympanic)   Resp 17   Wt 81 6 kg (180 lb) Comment: patient reported  LMP  (LMP Unknown)   SpO2 96%   BMI 26 58 kg/m²          Physical Exam   Constitutional: She appears well-developed  She is cooperative  Non-toxic appearance  She does not have a sickly appearance  No distress  HENT:   Head: Normocephalic and atraumatic  Right Ear: Tympanic membrane and ear canal normal    Left Ear: Tympanic membrane and ear canal normal    Nose: Mucosal edema and rhinorrhea present  Right sinus exhibits maxillary sinus tenderness and frontal sinus tenderness  Left sinus exhibits maxillary sinus tenderness and frontal sinus tenderness  Mouth/Throat: Uvula is midline, oropharynx is clear and moist and mucous membranes are normal  Tonsils are 0 on the right  Tonsils are 0 on the left  Eyes: Conjunctivae and lids are normal    Neck: Trachea normal and phonation normal  Neck supple  Cardiovascular: Normal rate, regular rhythm and normal heart sounds  Pulmonary/Chest: Effort normal and breath sounds normal    Lymphadenopathy:     She has cervical adenopathy  Right cervical: Superficial cervical adenopathy present  No deep cervical adenopathy present  Left cervical: Superficial cervical adenopathy present  No deep cervical adenopathy present  Right: No supraclavicular adenopathy present  Left: No supraclavicular adenopathy present  Neurological: She is alert  Skin: Skin is warm and dry  Capillary refill takes less than 2 seconds  She is not diaphoretic  No pallor  Nursing note and vitals reviewed

## 2019-10-29 DIAGNOSIS — Z12.11 COLON CANCER SCREENING: Primary | ICD-10-CM

## 2019-12-04 ENCOUNTER — HOSPITAL ENCOUNTER (OUTPATIENT)
Dept: RADIOLOGY | Facility: MEDICAL CENTER | Age: 64
Discharge: HOME | End: 2019-12-04
Payer: COMMERCIAL

## 2019-12-04 VITALS — BODY MASS INDEX: 26.66 KG/M2 | WEIGHT: 180 LBS | HEIGHT: 69 IN

## 2019-12-04 DIAGNOSIS — Z12.31 ENCOUNTER FOR SCREENING MAMMOGRAM FOR MALIGNANT NEOPLASM OF BREAST: ICD-10-CM

## 2019-12-04 DIAGNOSIS — R92.2 DENSE BREAST: ICD-10-CM

## 2019-12-04 PROCEDURE — 77067 SCR MAMMO BI INCL CAD: CPT

## 2019-12-04 PROCEDURE — 77063 BREAST TOMOSYNTHESIS BI: CPT

## 2020-03-07 NOTE — PROGRESS NOTES
Assessment/Plan     Healthy female exam      1  Rocio Kerns was seen today for physical exam and back pain  Diagnoses and all orders for this visit:    Annual physical exam    Impaired fasting glucose  -     Comprehensive metabolic panel; Future    Lipid screening  -     Lipid panel; Future    BMI 26 0-26 9,adult  -     Ambulatory referral to Weight Management; Future    Mixed hyperlipidemia    Need for hepatitis C screening test  -     Hepatitis C antibody; Future    Colon cancer screening  -     Occult Blood, Fecal Immunochemical; Future    Screening for deficiency anemia  -     CBC and differential; Future        2  Patient Counseling:  --Nutrition: Stressed importance of moderation in sodium/caffeine intake, saturated fat and cholesterol, caloric balance, sufficient intake of fresh fruits, vegetables, fiber, calcium, iron  --Exercise: Stressed the importance of regular exercise  --Injury prevention: Discussed safety belts, safety helmets, smoke detector, smoking near bedding or upholstery  --Dental health: Discussed importance of regular tooth brushing, flossing, and dental visits  --Immunizations reviewed  3  Discussed the patient's BMI with her  The BMI is above average; BMI management plan is completed  4  Follow up for AWV after 04/14  Subjective   Chief Complaint   Patient presents with    Physical Exam     Declines flu vaccine and pneumo vaccine, declines all CRC screenings      Back Pain     Left lower back pain when stretching or doing a dance position        Florence Stapleton is a 77 y o  female and is here for a comprehensive physical exam  The patient reports left foot problem had been limiting her ability to exercise, so gained weight back    Retiring in 4 days, will start on medicare 03/14, her private insurance is still active until then   Had torn tendons in lateral left ankle 2018, since then a bit of balance issues with side to side motions   Started dance classes with her , which helps     History: The following portions of the patient's history were reviewed and updated as appropriate: allergies, current medications, past family history, past medical history, past social history, past surgical history and problem list     Review of Systems  Do you have pain that bothers you in your daily life? no  Pertinent items are noted in HPI       Objective     General appearance: alert and oriented, in no acute distress  Head: Normocephalic, without obvious abnormality, atraumatic  Eyes: conjunctivae/corneas clear  PERRL, EOM's intact  Fundi benign  Ears: normal TM's and external ear canals both ears  Nose: Nares normal  Septum midline  Mucosa normal  No drainage or sinus tenderness  Throat: lips, mucosa, and tongue normal; teeth and gums normal  Neck: no adenopathy, no carotid bruit, no JVD, supple, symmetrical, trachea midline and thyroid not enlarged, symmetric, no tenderness/mass/nodules  Back: symmetric, no curvature  ROM normal  No CVA tenderness  Lungs: clear to auscultation bilaterally and normal percussion bilaterally  Heart: regular rate and rhythm, S1, S2 normal, no murmur, click, rub or gallop  Abdomen: soft, non-tender; bowel sounds normal; no masses,  no organomegaly  Extremities: extremities normal, warm and well-perfused; no cyanosis, clubbing, or edema  Pulses: 2+ and symmetric  Skin: Skin color, texture, turgor normal  No rashes or lesions  Lymph nodes: Cervical, supraclavicular, and axillary nodes normal   Neurologic: Grossly normal     BMI Counseling: Body mass index is 26 58 kg/m²  The BMI is above normal  Patient referred to weight management due to patient being overweight

## 2020-03-09 ENCOUNTER — OFFICE VISIT (OUTPATIENT)
Dept: FAMILY MEDICINE CLINIC | Facility: CLINIC | Age: 65
End: 2020-03-09
Payer: COMMERCIAL

## 2020-03-09 VITALS
HEART RATE: 89 BPM | OXYGEN SATURATION: 98 % | RESPIRATION RATE: 19 BRPM | DIASTOLIC BLOOD PRESSURE: 80 MMHG | SYSTOLIC BLOOD PRESSURE: 148 MMHG | TEMPERATURE: 98.6 F | HEIGHT: 69 IN | WEIGHT: 180 LBS | BODY MASS INDEX: 26.66 KG/M2

## 2020-03-09 DIAGNOSIS — R73.01 IMPAIRED FASTING GLUCOSE: ICD-10-CM

## 2020-03-09 DIAGNOSIS — Z00.00 ANNUAL PHYSICAL EXAM: Primary | ICD-10-CM

## 2020-03-09 DIAGNOSIS — Z12.11 COLON CANCER SCREENING: ICD-10-CM

## 2020-03-09 DIAGNOSIS — Z13.0 SCREENING FOR DEFICIENCY ANEMIA: ICD-10-CM

## 2020-03-09 DIAGNOSIS — E78.2 MIXED HYPERLIPIDEMIA: ICD-10-CM

## 2020-03-09 DIAGNOSIS — Z11.59 NEED FOR HEPATITIS C SCREENING TEST: ICD-10-CM

## 2020-03-09 DIAGNOSIS — Z13.220 LIPID SCREENING: ICD-10-CM

## 2020-03-09 PROBLEM — J01.00 ACUTE MAXILLARY SINUSITIS: Status: RESOLVED | Noted: 2019-10-08 | Resolved: 2020-03-09

## 2020-03-09 PROCEDURE — 3288F FALL RISK ASSESSMENT DOCD: CPT | Performed by: FAMILY MEDICINE

## 2020-03-09 PROCEDURE — 1160F RVW MEDS BY RX/DR IN RCRD: CPT | Performed by: FAMILY MEDICINE

## 2020-03-09 PROCEDURE — 99397 PER PM REEVAL EST PAT 65+ YR: CPT | Performed by: FAMILY MEDICINE

## 2020-03-09 PROCEDURE — 1101F PT FALLS ASSESS-DOCD LE1/YR: CPT | Performed by: FAMILY MEDICINE

## 2020-03-09 PROCEDURE — 3008F BODY MASS INDEX DOCD: CPT | Performed by: FAMILY MEDICINE

## 2020-06-08 ENCOUNTER — TELEPHONE (OUTPATIENT)
Dept: FAMILY MEDICINE CLINIC | Facility: CLINIC | Age: 65
End: 2020-06-08

## 2020-09-28 ENCOUNTER — ANNUAL EXAM (OUTPATIENT)
Dept: OBGYN CLINIC | Facility: CLINIC | Age: 65
End: 2020-09-28
Payer: MEDICARE

## 2020-09-28 VITALS
DIASTOLIC BLOOD PRESSURE: 80 MMHG | WEIGHT: 180 LBS | TEMPERATURE: 97.7 F | BODY MASS INDEX: 26.58 KG/M2 | SYSTOLIC BLOOD PRESSURE: 148 MMHG

## 2020-09-28 DIAGNOSIS — Z12.11 ENCOUNTER FOR SCREENING COLONOSCOPY: ICD-10-CM

## 2020-09-28 DIAGNOSIS — Z01.419 ENCOUNTER FOR GYNECOLOGICAL EXAMINATION WITHOUT ABNORMAL FINDING: ICD-10-CM

## 2020-09-28 DIAGNOSIS — Z12.31 ENCOUNTER FOR SCREENING MAMMOGRAM FOR MALIGNANT NEOPLASM OF BREAST: ICD-10-CM

## 2020-09-28 DIAGNOSIS — M81.0 OSTEOPOROSIS, UNSPECIFIED OSTEOPOROSIS TYPE, UNSPECIFIED PATHOLOGICAL FRACTURE PRESENCE: Primary | ICD-10-CM

## 2020-09-28 PROCEDURE — G0101 CA SCREEN;PELVIC/BREAST EXAM: HCPCS | Performed by: OBSTETRICS & GYNECOLOGY

## 2020-09-28 NOTE — PATIENT INSTRUCTIONS
Osteoporosis   WHAT YOU NEED TO KNOW:   What is osteoporosis? Osteoporosis is a long-term medical condition that causes your bones to become weak, brittle, and more likely to fracture  Osteoporosis occurs when your body absorbs more bone than it makes  It is also caused by a lack of calcium and estrogen (female hormone)  What increases my risk for osteoporosis? · Age older than 28    · Low estrogen levels    · Female gender    · Alcohol, tobacco, or caffeine    · Lack of calcium and vitamin D in your foods    · Lack of exercise    · Some illnesses, such as thyroid diseases, bone cancer, and long-term lung diseases    · Certain medicines, such as steroids, anticonvulsants, and blood-thinners  What are the signs and symptoms of osteoporosis? You may not have any signs or symptoms  You may break a bone after a muscle strain, bump, or fall  A break usually occurs in the hip, spine, or wrist  A collapsed vertebra (bone in your spine) may cause severe back pain or loss of height from bent posture  How is osteoporosis diagnosed? · Blood and urine tests  measure your calcium, vitamin D, and estrogen levels  · An x-ray or CT  may show thinned bones or a fracture  You may be given contrast liquid to help the bones show up better in the pictures  Tell the healthcare provider if you have ever had an allergic reaction to contrast liquid  Do not enter the MRI room with anything metal  Metal can cause serious injury  Tell the healthcare provider if you have any metal in or on your body  · A bone density test  compares your bone thickness with what is expected for someone of your age, gender, and ethnicity  How is osteoporosis treated? Medicines may be given to prevent bone loss, build new bone, and increase estrogen  These medicines help prevent fractures and may be given as a pill or injection  Ask your healthcare provider for more information on these medicines  How can I help prevent bone loss?    · Eat healthy foods that are high in calcium  This helps keep your bones strong  Good sources of calcium are milk, cheese, broccoli, tofu, almonds, and canned salmon and sardines  · Increase your vitamin D intake  Vitamin D is in fish oils, some vegetables, and fortified milk, cereal, and bread  Vitamin D is also formed in the skin when it is exposed to the sun  Ask your healthcare provider how much sunlight is safe for you  · Drink liquids as directed  Ask your healthcare provider how much liquid to drink each day and which liquids are best for you  Do not have alcohol or caffeine  They decrease bone mineral density, which can weaken your bones  · Exercise regularly  Ask your healthcare provider about the best exercise plan for you  Weight bearing exercise for 30 minutes, 3 times a week can help build and strengthen bone  · Do not smoke  Nicotine and other chemicals in cigarettes and cigars can cause lung damage  Ask your healthcare provider for information if you currently smoke and need help to quit  E-cigarettes or smokeless tobacco still contain nicotine  Talk to your healthcare provider before you use these products  · Go to physical therapy as directed  A physical therapist teaches you exercises to help improve movement and muscle strength  When should I seek immediate care? · You have severe pain  When should I contact my healthcare provider? · You have increasing pain after a fall  · You have pain when you do your daily activities  · You have questions or concerns about your condition or care  CARE AGREEMENT:   You have the right to help plan your care  Learn about your health condition and how it may be treated  Discuss treatment options with your caregivers to decide what care you want to receive  You always have the right to refuse treatment  The above information is an  only  It is not intended as medical advice for individual conditions or treatments  Talk to your doctor, nurse or pharmacist before following any medical regimen to see if it is safe and effective for you  © 2017 [employer] [employer] Information is for End User's use only and may not be sold, redistributed or otherwise used for commercial purposes  All illustrations and images included in CareNotes® are the copyrighted property of [employer]  or [employer]

## 2020-09-28 NOTE — PROGRESS NOTES
Assessment/Plan:    Encounter for gynecological examination without abnormal finding  Pap/HPV not indicated  Mammogram ordered  Colonoscopy refused  DEXA osteoporosis, ordered     Encourage healthy diet, exercise, Calcium 1200mg per day and at least 800 iu Vitamin D daily  Diagnoses and all orders for this visit:    Osteoporosis, unspecified osteoporosis type, unspecified pathological fracture presence  -     DXA bone density spine hip and pelvis; Future    Encounter for screening mammogram for malignant neoplasm of breast  -     Mammo screening bilateral w 3d & cad; Future    Encounter for screening colonoscopy  -     Ambulatory referral to Gastroenterology; Future    Encounter for gynecological examination without abnormal finding          Subjective:      Patient ID: Dylan Augustine is a 77 y o  female  Patient presents for a routine annual exam  Patient c/o of some vaginal odor for the past couple of months and has been using over the counter wipes  No itching or discharge  Last pap 4/21/16 (negative -HPV)  Last lashanda 12/14/19 (negative secreening)  Colonoscopy-Declined  Dexa: 10/18/18 osteoporosis  Patient is not a smoker  Patient is not a drinker  Retired now  Has two dogs she cares for - a [place]bal[place] greyhound and a sabina mix  Travels with her  - 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    Gynecologic Exam   The patient's pertinent negatives include no genital itching, genital lesions, genital odor, genital rash, pelvic pain, vaginal bleeding or vaginal discharge  The patient is experiencing no pain  Pertinent negatives include no chills, constipation, diarrhea, fever, nausea, sore throat or vomiting  She is not sexually active  The following portions of the patient's history were reviewed and updated as appropriate:   She  has a past medical history of Rectocele    She   Patient Active Problem List    Diagnosis Date Noted    BMI 26 0-26 9,adult 03/09/2020    Encounter for gynecological examination without abnormal finding 09/17/2018    Dense breasts 09/06/2017    Elevated CPK 12/01/2016    Numbness of right hand 11/22/2016    Depressed mood 12/29/2015    Osteoarthritis of hand 08/14/2014    Trochanteric bursitis of right hip 08/14/2014    Rosacea 08/14/2014    Localized osteoporosis 03/31/2014    Mixed hyperlipidemia 02/08/2012     She  has a past surgical history that includes Colporrhaphy; Breast surgery; Hysterectomy; Repair rectocele; Shoulder arthroscopy w/ rotator cuff repair (12/2014); Tubal ligation; and pr repair/graft flex foot tendon (Left, 7/23/2018)  Her family history includes Heart disease in her family; No Known Problems in her father, maternal grandfather, maternal grandmother, paternal grandfather, paternal grandmother, son, son, and son; Osteoporosis in her family; Skin cancer in her sister  She  reports that she has never smoked  She has never used smokeless tobacco  She reports current alcohol use  She reports that she does not use drugs  No current outpatient medications on file  No current facility-administered medications for this visit  Current Outpatient Medications on File Prior to Visit   Medication Sig    [DISCONTINUED] aspirin (ECOTRIN LOW STRENGTH) 81 mg EC tablet Take 1 tablet (81 mg total) by mouth 2 (two) times a day for 30 days (Patient taking differently: Take 81 mg by mouth as needed )    [DISCONTINUED] benzonatate (TESSALON) 200 MG capsule Take 1 capsule (200 mg total) by mouth 3 (three) times a day as needed for cough (Patient not taking: Reported on 9/28/2020)     No current facility-administered medications on file prior to visit  She is allergic to penicillins       Review of Systems   Constitutional: Negative for activity change, appetite change, chills, fatigue and fever  HENT: Negative for rhinorrhea, sneezing and sore throat  Eyes: Negative for visual disturbance     Respiratory: Negative for cough, shortness of breath and wheezing  Cardiovascular: Negative for chest pain, palpitations and leg swelling  Gastrointestinal: Negative for abdominal distention, constipation, diarrhea, nausea and vomiting  Genitourinary: Negative for difficulty urinating, pelvic pain and vaginal discharge  Neurological: Negative for syncope and light-headedness  Objective:      /80   Temp 97 7 °F (36 5 °C)   Wt 81 6 kg (180 lb)   LMP  (LMP Unknown)   BMI 26 58 kg/m²          Physical Exam  Constitutional:       General: She is not in acute distress  Appearance: Normal appearance  She is well-developed  She is not diaphoretic  Chest:      Breasts: Breasts are symmetrical          Right: No inverted nipple, mass, nipple discharge, skin change or tenderness  Left: No inverted nipple, mass, nipple discharge, skin change or tenderness  Abdominal:      Palpations: Abdomen is soft  Abdomen is not rigid  Tenderness: There is no abdominal tenderness  There is no guarding or rebound  Hernia: There is no hernia in the left inguinal area  Genitourinary:     Labia:         Right: No rash, tenderness, lesion or injury  Left: No rash, tenderness, lesion or injury  Vagina: No vaginal discharge or bleeding  Adnexa:         Right: No mass, tenderness or fullness  Left: No mass, tenderness or fullness  Comments: Urethral meatus: no lesions, non tender  Urethra: non tender    Vaginal mucosa atrophic  Skin:     General: Skin is warm and dry  Coloration: Skin is not pale  Findings: No erythema or rash

## 2020-10-20 ENCOUNTER — TELEPHONE (OUTPATIENT)
Dept: GASTROENTEROLOGY | Facility: CLINIC | Age: 65
End: 2020-10-20

## 2021-06-03 NOTE — ASSESSMENT & PLAN NOTE
[facility] DERMATOLOGY  [address] [place] [id number]  [phone]  [phone]     MRN: [id number] : 1957  Encounter: [id number]  Patient Information: Haydee Franz  Chief complaint:  Skin lesion and overall checkup    History of present illness:  33-year-old male presents for overall skin check concerned regarding spot on his right temple this is been present for about a year  No other concerns noted   Past Medical History:   Diagnosis Date    Acute delirium     history of post op delirium    Anxiety with Persistent Worry about Recurrent Panic Attacks     Depression     GERD (gastroesophageal reflux disease)     controlled with medications    History of Cindy-Barr virus infection     Osteoarthritis     Spinal cord stimulator dysfunction (HCC)      Past Surgical History:   Procedure Laterality Date    IMPLANTATION / PLACEMENT PERMANENT EPIDURAL CATHETER      Intrathecal  Dr Tracey Diaz   [facility]    JOINT REPLACEMENT Bilateral     OTHER SURGICAL HISTORY  2014    SF: Replacement of intrathecal pain pump reservoir w/programming    UT INSERT SPINE INFUSN DEVICE,SUBCUT Right 2020    Procedure: REPLACEMENT INTRATHECAL PAIN PUMP, RIGHT;  Surgeon: Garima Raza MD;  Location: [facility];  Service: Neurosurgery    REPLACEMENT TOTAL KNEE Right     [facility]/Dr Pino Scott REPLACEMENT TOTAL KNEE Left 2018    TONSILLECTOMY       Social History   Social History     Substance and Sexual Activity   Alcohol Use No     Social History     Substance and Sexual Activity   Drug Use No     Social History     Tobacco Use   Smoking Status Never Smoker   Smokeless Tobacco Never Used     Family History   Problem Relation Age of Onset    Lupus Mother     Heart failure Mother     Heart failure Father     Kidney failure Father         Acute    Heart disease Family     Neuropathy Family         Peripheral     Meds/Allergies Pap/HPV not indicated  Mammogram ordered  Colonoscopy refused  DEXA osteoporosis, ordered     Encourage healthy diet, exercise, Calcium 1200mg per day and at least 800 iu Vitamin D daily  Allergies   Allergen Reactions    Molds & Smuts Swelling and Other (See Comments)     Other reaction(s): watery, itchy eyes    Pregabalin Edema     lyrica     Cat Hair Extract Itching and Other (See Comments)     Other reaction(s): watery, itchy eyes  Dog hair, dog dander    Dust Mite Extract Other (See Comments)     Other reaction(s): watery, itchy eyes    Other      Pine nuts         Meds:  Prior to Admission medications    Medication Sig Start Date End Date Taking?  Authorizing Provider   anastrozole (ARIMIDEX) 1 mg tablet Take 1 mg by mouth 3 (three) times a week  5/7/18  Yes Historical Provider, MD   ascorbic acid (VITAMIN C) 500 mg tablet Take 500 mg by mouth every other day    Yes Historical Provider, MD   aspirin (ASPIR-LOW) 81 mg EC tablet Take 1 tablet by mouth daily   Yes Historical Provider, MD   b complex vitamins tablet Take 1 tablet by mouth as needed    Yes Historical Provider, MD   BD ECLIPSE SYRINGE 23G X 1" 3 ML MISC As directed 5/31/18  Yes Historical Provider, MD   buPROPion [facility] SR) 150 mg 12 hr tablet Take 450 mg by mouth daily  10/21/14  Yes Historical Provider, MD   cholecalciferol (VITAMIN D3) 1,000 units tablet Take 1,000 Units by mouth as needed    Yes Historical Provider, MD   diphenhydrAMINE (BENADRYL) 25 mg tablet Take 25 mg by mouth daily at bedtime as needed for sleep   Yes Historical Provider, MD   famotidine (PEPCID) 40 MG tablet 40 mg as needed  4/20/19  Yes Historical Provider, MD   ibuprofen (MOTRIN) 200 mg tablet 600-800 mg  TID PRN   Yes Historical Provider, MD   NON FORMULARY Compound pain cream   Yes Historical Provider, MD   Omega-3 Fatty Acids (FISH OIL) 1,000 mg Take 1 capsule by mouth daily     Yes Historical Provider, MD   oxyCODONE (OxyCONTIN) 80 mg 12 hr tablet Take 40 mg by mouth every 12 (twelve) hours    Yes Historical Provider, MD   oxyCODONE (ROXICODONE) 30 MG immediate release tablet Take 30 mg by mouth 2 (two) times a day    Yes Historical Provider, MD   sildenafil (VIAGRA) 100 mg tablet Take 100 mg by mouth as needed     Yes Historical Provider, MD   testosterone cypionate (DEPO-TESTOSTERONE) 200 mg/mL SOLN Inject into a muscle Takes every 4 days   Yes Historical Provider, MD   tiZANidine (ZANAFLEX) 4 mg tablet Take 12 mg by mouth every 4 (four) hours as needed for muscle spasms   Yes Historical Provider, MD   ALPRAZolam Ardelle Fritter) 1 mg tablet Take 1 tablet by mouth daily at bedtime as needed 1 to 2 tablets a day 11/18/14   Historical Provider, MD   gabapentin (NEURONTIN) 300 mg capsule daily at bedtime as needed  8/27/19   Historical Provider, MD   methocarbamol (ROBAXIN) 750 mg tablet Take 500 mg by mouth 2 (two) times a day  4/26/19   Historical Provider, MD       Subjective:     Review of Systems:    General: negative for - chills, fatigue, fever,  weight gain or weight loss  Psychological: negative for - anxiety, behavioral disorder, concentration difficulties, decreased libido, depression, irritability, memory difficulties, mood swings, sleep disturbances or suicidal ideation  ENT: negative for - hearing difficulties , nasal congestion, nasal discharge, oral lesions, sinus pain, sneezing, sore throat  Allergy and Immunology: negative for - hives, insect bite sensitivity,  Hematological and Lymphatic: negative for - bleeding problems, blood clots,bruising, swollen lymph nodes  Endocrine: negative for - hair pattern changes, hot flashes, malaise/lethargy, mood swings, palpitations, polydipsia/polyuria, skin changes, temperature intolerance or unexpected weight change  Respiratory: negative for - cough, hemoptysis, orthopnea, shortness of breath, or wheezing  Cardiovascular: negative for - chest pain, dyspnea on exertion, edema,  Gastrointestinal: negative for - abdominal pain, nausea/vomiting  Genito-Urinary: negative for - dysuria, incontinence, irregular/heavy menses or urinary frequency/urgency  Musculoskeletal: negative for - gait disturbance, joint pain, joint stiffness, joint swelling, muscle pain, muscular weakness  Dermatological:  As in HPI  Neurological: negative for confusion, dizziness, headaches, impaired coordination/balance, memory loss, numbness/tingling, seizures, speech problems, tremors or weakness       Objective: There were no vitals taken for this visit  Physical Exam:    General Appearance:    Alert, cooperative, no distress   Head:    Normocephalic, without obvious abnormality, atraumatic           Skin:   A full skin exam was performed including scalp, head scalp, eyes, ears, nose, lips, neck, chest, axilla, abdomen, back, buttocks, bilateral upper extremities, bilateral lower extremities, hands, feet, fingers, toes, fingernails, and toenails scaling erythematous patch noted on the right temple normal keratotic papules with greasy stuck on appearance nothing else remarkable noted on complete exam  Physical Exam  HENT:      Head:          Cryotherapy Procedure Note    Pre-operative Diagnosis: actinic keratosis    Plan:  1  Instructed to keep the area dry and clean thereafter  Apply petrolatum if area gets crusty  2  Recommended that the patient use acetaminophen  as needed for pain  Locations:  Right temple    Indications: Destruction of actinic keratosis times 1    Patient informed of risks (permanent scarring, infection, light or dark discoloration, bleeding, infection, weakness, numbness and recurrence of the lesion) and benefits of the procedure and verbal informed consent obtained  The areas are treated with liquid nitrogen therapy, frozen until ice ball extended 2 mm beyond lesion, allowed to thaw, and treated again  The patient tolerated procedure well  The patient was instructed on post-op care, warned that there may be blister formation, redness and pain  Recommend OTC analgesia as needed for pain  Condition:  Stable    Complications:  none  Assessment:     1  Actinic keratosis     2   Seborrheic keratosis     3  Screening for skin condition           Plan:   Actinic Keratosis:  Patient advised lesions are precancers  These should resolve with cryosurgery if not to let us know sun block recommended  Seborrheic Keratosis  Patient reasurred these are normal growths we acquire with age no treatment needed  Screening for Dermatologic Disorders: Nothing else of concern noted on complete exam follow up in 1 year       Maricarmen Damon MD  6/3/2021,2:52 PM    Portions of the record may have been created with voice recognition software   Occasional wrong word or "sound a like" substitutions may have occurred due to the inherent limitations of voice recognition software   Read the chart carefully and recognize, using context, where substitutions have occurred

## 2021-10-04 ENCOUNTER — TELEPHONE (OUTPATIENT)
Dept: FAMILY MEDICINE CLINIC | Facility: CLINIC | Age: 66
End: 2021-10-04

## 2021-11-04 ENCOUNTER — TELEPHONE (OUTPATIENT)
Dept: FAMILY MEDICINE CLINIC | Facility: CLINIC | Age: 66
End: 2021-11-04

## 2021-11-24 ENCOUNTER — TELEMEDICINE (OUTPATIENT)
Dept: FAMILY MEDICINE CLINIC | Facility: CLINIC | Age: 66
End: 2021-11-24
Payer: MEDICARE

## 2021-11-24 ENCOUNTER — TELEPHONE (OUTPATIENT)
Dept: FAMILY MEDICINE CLINIC | Facility: CLINIC | Age: 66
End: 2021-11-24

## 2021-11-24 DIAGNOSIS — H92.03 OTALGIA OF BOTH EARS: ICD-10-CM

## 2021-11-24 DIAGNOSIS — B34.9 VIRAL INFECTION, UNSPECIFIED: Primary | ICD-10-CM

## 2021-11-24 DIAGNOSIS — R09.81 NASAL CONGESTION: ICD-10-CM

## 2021-11-24 PROCEDURE — U0003 INFECTIOUS AGENT DETECTION BY NUCLEIC ACID (DNA OR RNA); SEVERE ACUTE RESPIRATORY SYNDROME CORONAVIRUS 2 (SARS-COV-2) (CORONAVIRUS DISEASE [COVID-19]), AMPLIFIED PROBE TECHNIQUE, MAKING USE OF HIGH THROUGHPUT TECHNOLOGIES AS DESCRIBED BY CMS-2020-01-R: HCPCS | Performed by: FAMILY MEDICINE

## 2021-11-24 PROCEDURE — 99214 OFFICE O/P EST MOD 30 MIN: CPT | Performed by: FAMILY MEDICINE

## 2021-11-24 PROCEDURE — U0005 INFEC AGEN DETEC AMPLI PROBE: HCPCS | Performed by: FAMILY MEDICINE

## 2021-11-24 RX ORDER — AZITHROMYCIN 250 MG/1
TABLET, FILM COATED ORAL
Qty: 6 TABLET | Refills: 0 | Status: SHIPPED | OUTPATIENT
Start: 2021-11-24 | End: 2021-11-28

## 2021-11-24 RX ORDER — AZITHROMYCIN 250 MG/1
TABLET, FILM COATED ORAL
Qty: 6 TABLET | Refills: 0 | Status: SHIPPED | OUTPATIENT
Start: 2021-11-24 | End: 2021-11-24 | Stop reason: SDUPTHER

## 2021-11-26 LAB — SARS-COV-2 RNA RESP QL NAA+PROBE: NEGATIVE

## 2021-12-03 ENCOUNTER — TELEPHONE (OUTPATIENT)
Dept: FAMILY MEDICINE CLINIC | Facility: CLINIC | Age: 66
End: 2021-12-03

## 2022-08-10 ENCOUNTER — OFFICE VISIT (OUTPATIENT)
Dept: FAMILY MEDICINE CLINIC | Facility: CLINIC | Age: 67
End: 2022-08-10
Payer: MEDICARE

## 2022-08-10 VITALS
OXYGEN SATURATION: 98 % | SYSTOLIC BLOOD PRESSURE: 130 MMHG | BODY MASS INDEX: 26.66 KG/M2 | WEIGHT: 180 LBS | HEART RATE: 60 BPM | HEIGHT: 69 IN | DIASTOLIC BLOOD PRESSURE: 88 MMHG | TEMPERATURE: 98 F

## 2022-08-10 DIAGNOSIS — M25.522 LEFT ELBOW PAIN: ICD-10-CM

## 2022-08-10 DIAGNOSIS — Z53.20 COLON CANCER SCREENING DECLINED: ICD-10-CM

## 2022-08-10 DIAGNOSIS — Z53.20 SCREENING MAMMOGRAPHY DECLINED: ICD-10-CM

## 2022-08-10 DIAGNOSIS — Z91.81 HISTORY OF RECENT FALL: ICD-10-CM

## 2022-08-10 DIAGNOSIS — Z13.31 NEGATIVE DEPRESSION SCREENING: ICD-10-CM

## 2022-08-10 DIAGNOSIS — Z12.31 BREAST CANCER SCREENING BY MAMMOGRAM: ICD-10-CM

## 2022-08-10 DIAGNOSIS — Z53.20 OSTEOPOROSIS SCREENING DECLINED: ICD-10-CM

## 2022-08-10 DIAGNOSIS — R07.81 RIB PAIN ON LEFT SIDE: ICD-10-CM

## 2022-08-10 DIAGNOSIS — Z00.00 MEDICARE ANNUAL WELLNESS VISIT, SUBSEQUENT: Primary | ICD-10-CM

## 2022-08-10 DIAGNOSIS — R03.0 BLOOD PRESSURE ELEVATED WITHOUT HISTORY OF HTN: ICD-10-CM

## 2022-08-10 PROCEDURE — G0438 PPPS, INITIAL VISIT: HCPCS | Performed by: FAMILY MEDICINE

## 2022-08-10 PROCEDURE — 99214 OFFICE O/P EST MOD 30 MIN: CPT | Performed by: FAMILY MEDICINE

## 2022-08-10 NOTE — PROGRESS NOTES
Assessment and Plan:     Problem List Items Addressed This Visit        Other    Screening mammography declined    Osteoporosis screening declined    Negative depression screening    Medicare annual wellness visit, subsequent - Primary    Colon cancer screening declined    BMI 26 0-26 9,adult    Blood pressure elevated without history of HTN      Other Visit Diagnoses     History of recent fall        Relevant Orders    XR elbow 3+ vw left (Completed)    Rib pain on left side        Left elbow pain        Relevant Orders    XR elbow 3+ vw left (Completed)    Breast cancer screening by mammogram        Relevant Orders    Mammo screening bilateral w 3d & cad           Preventive health issues were discussed with patient, and age appropriate screening tests were ordered as noted in patient's After Visit Summary  Personalized health advice and appropriate referrals for health education or preventive services given if needed, as noted in patient's After Visit Summary       History of Present Illness:     Patient presents for a Medicare Wellness Visit    HPI   Patient Care Team:  Arielle Weeks DO as PCP - General     Review of Systems:     Review of Systems     Problem List:     Patient Active Problem List   Diagnosis    Mixed hyperlipidemia    Localized osteoporosis    Osteoarthritis of hand    Numbness of right hand    Trochanteric bursitis of right hip    Rosacea    Elevated CPK    Dense breasts    Encounter for gynecological examination without abnormal finding    BMI 26 0-26 9,adult    Negative depression screening    Blood pressure elevated without history of HTN    Medicare annual wellness visit, subsequent    Colon cancer screening declined    Screening mammography declined    Osteoporosis screening declined      Past Medical and Surgical History:     Past Medical History:   Diagnosis Date    Depressed mood 12/29/2015    Rectocele     last assessed 4/18/16     Past Surgical History: Procedure Laterality Date    BREAST SURGERY      enlargement    COLPORRHAPHY      anterior, repair of cystocele, last assessed 8/14/14    HYSTERECTOMY      UT REPAIR/GRAFT FLEX FOOT TENDON Left 7/23/2018    Procedure: PERONEAL TENDON REPAIR;  Surgeon: Stephanie Malik DPM;  Location: [facility];  Service: [address]      last assessed 8/14/14    SHOULDER ARTHROSCOPY W/ ROTATOR CUFF REPAIR  12/2014    Dr [facility]ilyn [facility] at [facility]    TUBAL LIGATION        Family History:     Family History   Problem Relation Age of Onset    No Known Problems Father     Heart disease Family     Osteoporosis Family     Skin cancer Sister     No Known Problems Maternal Grandmother     No Known Problems Maternal Grandfather     No Known Problems Paternal Grandmother     No Known Problems Paternal Grandfather     No Known Problems Son     No Known Problems Son     No Known Problems Son       Social History:     Social History     Socioeconomic History    Marital status: /Civil Union     Spouse name: None    Number of children: None    Years of education: None    Highest education level: None   Occupational History    None   Tobacco Use    Smoking status: Never Smoker    Smokeless tobacco: Never Used   Vaping Use    Vaping Use: Never used   Substance and Sexual Activity    Alcohol use: Yes     Comment: social    Drug use: No    Sexual activity: Not Currently     Birth control/protection: Surgical   Other Topics Concern    None   Social History Narrative    Always uses seatbelt    Exercising regular     Social Determinants of Health     Financial Resource Strain: Unknown    Difficulty of Paying Living Expenses: Patient refused   Food Insecurity: Not on file   Transportation Needs: Unknown    Lack of Transportation (Medical): Patient refused    Lack of Transportation (Non-Medical): Patient refused   Physical Activity: Not on file   Stress: Not on file   Social Connections: Not on file Intimate Partner Violence: Not on file   Housing Stability: Not on file      Medications and Allergies:     No current outpatient medications on file  No current facility-administered medications for this visit  Allergies   Allergen Reactions    Penicillins Seizures      Immunizations:     Immunization History   Administered Date(s) Administered    INFLUENZA 10/31/2007, 10/29/2008      Health Maintenance:         Topic Date Due    Hepatitis C Screening  Never done    Colorectal Cancer Screening  Never done    Breast Cancer Screening: Mammogram  12/04/2020         Topic Date Due    COVID-19 Vaccine (1) Never done    Pneumococcal Vaccine: 65+ Years (1 - PCV) Never done    Influenza Vaccine (1) 09/01/2022      Medicare Screening Tests and Risk Assessments:     Kai is here for her Subsequent Wellness visit  Health Risk Assessment:   Patient rates overall health as very good  Patient feels that their physical health rating is same  Patient is very satisfied with their life  Eyesight was rated as same  Hearing was rated as same  Patient feels that their emotional and mental health rating is same  Patients states they are never, rarely angry  Patient states they are sometimes unusually tired/fatigued  Pain experienced in the last 7 days has been a lot  Patient's pain rating has been 8/10  Patient states that she has experienced no weight loss or gain in last 6 months  Depression Screening:   PHQ-2 Score: 0      Fall Risk Screening: In the past year, patient has experienced: history of falling in past year    Number of falls: 1  Injured during fall?: Yes    Feels unsteady when standing or walking?: No    Worried about falling?: No      Urinary Incontinence Screening:   Patient has leaked urine accidently in the last six months  Home Safety:  Patient does not have trouble with stairs inside or outside of their home  Patient has working smoke alarms and has working carbon monoxide detector  Home safety hazards include: none  Nutrition:   Current diet is Regular  Medications:   Patient is not currently taking any over-the-counter supplements  Patient is able to manage medications  Activities of Daily Living (ADLs)/Instrumental Activities of Daily Living (IADLs):   Walk and transfer into and out of bed and chair?: Yes  Dress and groom yourself?: Yes    Bathe or shower yourself?: Yes    Feed yourself? Yes  Do your laundry/housekeeping?: Yes  Manage your money, pay your bills and track your expenses?: Yes  Make your own meals?: Yes    Do your own shopping?: Yes    Previous Hospitalizations:   Any hospitalizations or ED visits within the last 12 months?: No      Advance Care Planning:   Living will: No    Advanced directive: No    Advanced directive counseling given: Yes    Five wishes given: Yes      Cognitive Screening:   Provider or family/friend/caregiver concerned regarding cognition?: No    PREVENTIVE SCREENINGS      Cardiovascular Screening:    General: Screening Not Indicated and History Lipid Disorder      Diabetes Screening:     General: Patient Declines      Breast Cancer Screening:     General: Patient Declines      Cervical Cancer Screening:    General: Screening Not Indicated      Osteoporosis Screening:    General: Screening Not Indicated, History Osteoporosis and Patient Declines      Abdominal Aortic Aneurysm (AAA) Screening:        General: Screening Not Indicated      Lung Cancer Screening:     General: Screening Not Indicated      Hepatitis C Screening:    General: Patient Declines    Screening, Brief Intervention, and Referral to Treatment (SBIRT)    Screening  Typical number of drinks in a day: 0  Typical number of drinks in a week: 0  Interpretation: Low risk drinking behavior      AUDIT-C Screenin) How often did you have a drink containing alcohol in the past year? never  2) How many drinks did you have on a typical day when you were drinking in the past year? 0  3) How often did you have 6 or more drinks on one occasion in the past year? never    AUDIT-C Score: 0  Interpretation: Score 0-2 (female): Negative screen for alcohol misuse    Single Item Drug Screening:  How often have you used an illegal drug (including marijuana) or a prescription medication for non-medical reasons in the past year? never    Single Item Drug Screen Score: 0  Interpretation: Negative screen for possible drug use disorder    No exam data present     Physical Exam:     /88 (BP Location: Right arm, Patient Position: Sitting)   Pulse 60   Temp 98 °F (36 7 °C)   Ht 5' 9" (1 753 m)   Wt 81 6 kg (180 lb)   LMP  (LMP Unknown)   SpO2 98%   BMI 26 58 kg/m²     Physical Exam     Jean Claude Munoz,

## 2022-08-10 NOTE — PATIENT INSTRUCTIONS
Fall Prevention   AMBULATORY CARE:   Fall prevention  includes ways to make your home and other areas safer  It also includes ways you can move more carefully to prevent a fall  Health conditions that cause changes in your blood pressure, vision, or muscle strength and coordination may increase your risk for falls  Medicines may also increase your risk for falls if they make you dizzy, weak, or sleepy  Call 911 or have someone else call if:   · You have fallen and are unconscious  · You have fallen and cannot move part of your body  Contact your healthcare provider if:   · You have fallen and have pain or a headache  · You have questions or concerns about your condition or care  Fall prevention tips:   · Stand or sit up slowly  This may help you keep your balance and prevent falls  · Use assistive devices as directed  Your healthcare provider may suggest that you use a cane or walker to help you keep your balance  You may need to have grab bars put in your bathroom near the toilet or in the shower  · Wear shoes that fit well and have soles that   Wear shoes both inside and outside  Use slippers with good   Do not wear shoes with high heels  · Wear a personal alarm  This is a device that allows you to call 911 if you fall and need help  Ask your healthcare provider for more information  · Stay active  Exercise can help strengthen your muscles and improve your balance  Your healthcare provider may recommend water aerobics or walking  He or she may also recommend physical therapy to improve your coordination  Never start an exercise program without talking to your healthcare provider first          · Manage your medical conditions  Keep all appointments with your healthcare providers  Visit your eye doctor as directed  Home safety tips:       · Add items to prevent falls in the bathroom  Put nonslip strips on your bath or shower floor to prevent you from slipping   Use a bath mat if you do not have carpet in the bathroom  This will prevent you from falling when you step out of the bath or shower  Use a shower seat so you do not need to stand while you shower  Sit on the toilet or a chair in your bathroom to dry yourself and put on clothing  This will prevent you from losing your balance from drying or dressing yourself while you are standing  · Keep paths clear  Remove books, shoes, and other objects from walkways and stairs  Place cords for telephones and lamps out of the way so that you do not need to walk over them  Tape them down if you cannot move them  Remove small rugs  If you cannot remove a rug, secure it with double-sided tape  This will prevent you from tripping  · Install bright lights in your home  Use night lights to help light paths to the bathroom or kitchen  Always turn on the light before you start walking  · Keep items you use often on shelves within reach  Do not use a step stool to help you reach an item  · Paint or place reflective tape on the edges of your stairs  This will help you see the stairs better  Follow up with your doctor as directed:  Write down your questions so you remember to ask them during your visits  © Copyright [address] 2022 Information is for End User's use only and may not be sold, redistributed or otherwise used for commercial purposes  All illustrations and images included in CareNotes® are the copyrighted property of A D A M , Inc  or [facility] Tyrone Roberson   The above information is an  only  It is not intended as medical advice for individual conditions or treatments  Talk to your doctor, nurse or pharmacist before following any medical regimen to see if it is safe and effective for you  Medicare Preventive Visit Patient Instructions  Thank you for completing your Welcome to Medicare Visit or Medicare Annual Wellness Visit today  Your next wellness visit will be due in one year (8/11/2023)    The screening/preventive services that you may require over the next 5-10 years are detailed below  Some tests may not apply to you based off risk factors and/or age  Screening tests ordered at today's visit but not completed yet may show as past due  Also, please note that scanned in results may not display below  Preventive Screenings:  Service Recommendations Previous Testing/Comments   Colorectal Cancer Screening  * Colonoscopy    * Fecal Occult Blood Test (FOBT)/Fecal Immunochemical Test (FIT)  * Fecal DNA/Cologuard Test  * Flexible Sigmoidoscopy Age: 39-70 years old   Colonoscopy: every 10 years (may be performed more frequently if at higher risk)  OR  FOBT/FIT: every 1 year  OR  Cologuard: every 3 years  OR  Sigmoidoscopy: every 5 years  Screening may be recommended earlier than age 39 if at higher risk for colorectal cancer  Also, an individualized decision between you and your healthcare provider will decide whether screening between the ages of 74-80 would be appropriate  Colonoscopy: Not on file  FOBT/FIT: Not on file  Cologuard: Not on file  Sigmoidoscopy: Not on file          Breast Cancer Screening Age: 36 years old  Frequency: every 1-2 years  Not required if history of left and right mastectomy Mammogram: 12/04/2019        Cervical Cancer Screening Between the ages of 21-29, pap smear recommended once every 3 years  Between the ages of 33-67, can perform pap smear with HPV co-testing every 5 years     Recommendations may differ for women with a history of total hysterectomy, cervical cancer, or abnormal pap smears in past  Pap Smear: 09/28/2020    Screening Not Indicated   Hepatitis C Screening Once for adults born between 1945 and 1965  More frequently in patients at high risk for Hepatitis C Hep C Antibody: Not on file        Diabetes Screening 1-2 times per year if you're at risk for diabetes or have pre-diabetes Fasting glucose: 108 mg/dL (7/14/2018)  A1C: No results in last 5 years (No results in last 5 years)      Cholesterol Screening Once every 5 years if you don't have a lipid disorder  May order more often based on risk factors  Lipid panel: Not on file    Screening Not Indicated  History Lipid Disorder     Other Preventive Screenings Covered by Medicare:  1  Abdominal Aortic Aneurysm (AAA) Screening: covered once if your at risk  You're considered to be at risk if you have a family history of AAA  2  Lung Cancer Screening: covers low dose CT scan once per year if you meet all of the following conditions: (1) Age 50-69; (2) No signs or symptoms of lung cancer; (3) Current smoker or have quit smoking within the last 15 years; (4) You have a tobacco smoking history of at least 20 pack years (packs per day multiplied by number of years you smoked); (5) You get a written order from a healthcare provider  3  Glaucoma Screening: covered annually if you're considered high risk: (1) You have diabetes OR (2) Family history of glaucoma OR (3)  aged 48 and older OR (3)  American aged 72 and older  3  Osteoporosis Screening: covered every 2 years if you meet one of the following conditions: (1) You're estrogen deficient and at risk for osteoporosis based off medical history and other findings; (2) Have a vertebral abnormality; (3) On glucocorticoid therapy for more than 3 months; (4) Have primary hyperparathyroidism; (5) On osteoporosis medications and need to assess response to drug therapy  · Last bone density test (DXA Scan): 10/18/2018  5  HIV Screening: covered annually if you're between the age of 12-76  Also covered annually if you are younger than 13 and older than 72 with risk factors for HIV infection  For pregnant patients, it is covered up to 3 times per pregnancy      Immunizations:  Immunization Recommendations   Influenza Vaccine Annual influenza vaccination during flu season is recommended for all persons aged >= 6 months who do not have contraindications   Pneumococcal Vaccine   * Pneumococcal conjugate vaccine = PCV13 (Prevnar 13), PCV15 (Vaxneuvance), PCV20 (Prevnar 20)  * Pneumococcal polysaccharide vaccine = PPSV23 (Pneumovax) Adults 2364 years old: 1-3 doses may be recommended based on certain risk factors  Adults 72 years old: 1-2 doses may be recommended based off what pneumonia vaccine you previously received   Hepatitis B Vaccine 3 dose series if at intermediate or high risk (ex: diabetes, end stage renal disease, liver disease)   Tetanus (Td) Vaccine - COST NOT COVERED BY MEDICARE PART B Following completion of primary series, a booster dose should be given every 10 years to maintain immunity against tetanus  Td may also be given as tetanus wound prophylaxis  Tdap Vaccine - COST NOT COVERED BY MEDICARE PART B Recommended at least once for all adults  For pregnant patients, recommended with each pregnancy  Shingles Vaccine (Shingrix) - COST NOT COVERED BY MEDICARE PART B  2 shot series recommended in those aged 48 and above     Health Maintenance Due:      Topic Date Due    Hepatitis C Screening  Never done    Colorectal Cancer Screening  Never done    Breast Cancer Screening: Mammogram  12/04/2020     Immunizations Due:      Topic Date Due    COVID-19 Vaccine (1) Never done    Pneumococcal Vaccine: 65+ Years (1 - PCV) Never done    Influenza Vaccine (1) 09/01/2022     Advance Directives   What are advance directives? Advance directives are legal documents that state your wishes and plans for medical care  These plans are made ahead of time in case you lose your ability to make decisions for yourself  Advance directives can apply to any medical decision, such as the treatments you want, and if you want to donate organs  What are the types of advance directives? There are many types of advance directives, and each state has rules about how to use them  You may choose a combination of any of the following:  · Living will:   This is a written record of the treatment you want  You can also choose which treatments you do not want, which to limit, and which to stop at a certain time  This includes surgery, medicine, IV fluid, and tube feedings  · Durable power of  for healthcare [facility]): This is a written record that states who you want to make healthcare choices for you when you are unable to make them for yourself  This person, called a proxy, is usually a family member or a friend  You may choose more than 1 proxy  · Do not resuscitate (DNR) order:  A DNR order is used in case your heart stops beating or you stop breathing  It is a request not to have certain forms of treatment, such as CPR  A DNR order may be included in other types of advance directives  · Medical directive: This covers the care that you want if you are in a coma, near death, or unable to make decisions for yourself  You can list the treatments you want for each condition  Treatment may include pain medicine, surgery, blood transfusions, dialysis, IV or tube feedings, and a ventilator (breathing machine)  · Values history: This document has questions about your views, beliefs, and how you feel and think about life  This information can help others choose the care that you would choose  Why are advance directives important? An advance directive helps you control your care  Although spoken wishes may be used, it is better to have your wishes written down  Spoken wishes can be misunderstood, or not followed  Treatments may be given even if you do not want them  An advance directive may make it easier for your family to make difficult choices about your care  Fall Prevention    Fall prevention  includes ways to make your home and other areas safer  It also includes ways you can move more carefully to prevent a fall  Health conditions that cause changes in your blood pressure, vision, or muscle strength and coordination may increase your risk for falls   Medicines may also increase your risk for falls if they make you dizzy, weak, or sleepy  Fall prevention tips:   · Stand or sit up slowly  · Use assistive devices as directed  · Wear shoes that fit well and have soles that   · Wear a personal alarm  · Stay active  · Manage your medical conditions  Home Safety Tips:  · Add items to prevent falls in the bathroom  · Keep paths clear  · Install bright lights in your home  · Keep items you use often on shelves within reach  · Paint or place reflective tape on the edges of your stairs  Urinary Incontinence   Urinary incontinence (UI)  is when you lose control of your bladder  UI develops because your bladder cannot store or empty urine properly  The 3 most common types of UI are stress incontinence, urge incontinence, or both  Medicines:   · May be given to help strengthen your bladder control  Report any side effects of medication to your healthcare provider  Do pelvic muscle exercises often:  Your pelvic muscles help you stop urinating  Squeeze these muscles tight for 5 seconds, then relax for 5 seconds  Gradually work up to squeezing for 10 seconds  Do 3 sets of 15 repetitions a day, or as directed  This will help strengthen your pelvic muscles and improve bladder control  Train your bladder:  Go to the bathroom at set times, such as every 2 hours, even if you do not feel the urge to go  You can also try to hold your urine when you feel the urge to go  For example, hold your urine for 5 minutes when you feel the urge to go  As that becomes easier, hold your urine for 10 minutes  Self-care:   · Keep a UI record  Write down how often you leak urine and how much you leak  Make a note of what you were doing when you leaked urine  · Drink liquids as directed  You may need to limit the amount of liquid you drink to help control your urine leakage  Do not drink any liquid right before you go to bed   Limit or do not have drinks that contain caffeine or alcohol  · Prevent constipation  Eat a variety of high-fiber foods  Good examples are high-fiber cereals, beans, vegetables, and whole-grain breads  Walking is the best way to trigger your intestines to have a bowel movement  · Exercise regularly and maintain a healthy weight  Weight loss and exercise will decrease pressure on your bladder and help you control your leakage  · Use a catheter as directed  to help empty your bladder  A catheter is a tiny, plastic tube that is put into your bladder to drain your urine  · Go to behavior therapy as directed  Behavior therapy may be used to help you learn to control your urge to urinate  Weight Management   Why it is important to manage your weight:  Being overweight increases your risk of health conditions such as heart disease, high blood pressure, type 2 diabetes, and certain types of cancer  It can also increase your risk for osteoarthritis, sleep apnea, and other respiratory problems  Aim for a slow, steady weight loss  Even a small amount of weight loss can lower your risk of health problems  How to lose weight safely:  A safe and healthy way to lose weight is to eat fewer calories and get regular exercise  You can lose up about 1 pound a week by decreasing the number of calories you eat by 500 calories each day  Healthy meal plan for weight management:  A healthy meal plan includes a variety of foods, contains fewer calories, and helps you stay healthy  A healthy meal plan includes the following:  · Eat whole-grain foods more often  A healthy meal plan should contain fiber  Fiber is the part of grains, fruits, and vegetables that is not broken down by your body  Whole-grain foods are healthy and provide extra fiber in your diet  Some examples of whole-grain foods are whole-wheat breads and pastas, oatmeal, brown rice, and bulgur  · Eat a variety of vegetables every day    Include dark, leafy greens such as spinach, kale, cassidy greens, and mustard greens  Eat yellow and orange vegetables such as carrots, sweet potatoes, and winter squash  · Eat a variety of fruits every day  Choose fresh or canned fruit (canned in its own juice or light syrup) instead of juice  Fruit juice has very little or no fiber  · Eat low-fat dairy foods  Drink fat-free (skim) milk or 1% milk  Eat fat-free yogurt and low-fat cottage cheese  Try low-fat cheeses such as mozzarella and other reduced-fat cheeses  · Choose meat and other protein foods that are low in fat  Choose beans or other legumes such as split peas or lentils  Choose fish, skinless poultry (chicken or turkey), or lean cuts of red meat (beef or pork)  Before you cook meat or poultry, cut off any visible fat  · Use less fat and oil  Try baking foods instead of frying them  Add less fat, such as margarine, sour cream, regular salad dressing and mayonnaise to foods  Eat fewer high-fat foods  Some examples of high-fat foods include french fries, doughnuts, ice cream, and cakes  · Eat fewer sweets  Limit foods and drinks that are high in sugar  This includes candy, cookies, regular soda, and sweetened drinks  Exercise:  Exercise at least 30 minutes per day on most days of the week  Some examples of exercise include walking, biking, dancing, and swimming  You can also fit in more physical activity by taking the stairs instead of the elevator or parking farther away from stores  Ask your healthcare provider about the best exercise plan for you  © Copyright [employer] 2018 Information is for End User's use only and may not be sold, redistributed or otherwise used for commercial purposes   All illustrations and images included in CareNotes® are the copyrighted property of A D A M , Inc  or [address] [place] [address] [employer]

## 2022-08-10 NOTE — PROGRESS NOTES
Assessment/Plan:         Diagnoses and all orders for this visit:    Medicare annual wellness visit, subsequent    History of recent fall  -     Cancel: XR ribs 2 vw left; Future  -     XR elbow 3+ vw left; Future    Rib pain on left side  -     Cancel: XR ribs 2 vw left; Future    Left elbow pain  -     XR elbow 3+ vw left; Future    Blood pressure elevated without history of HTN  Comments:  monitor    BMI 26 0-26 9,adult    Negative depression screening    Breast cancer screening by mammogram  -     Mammo screening bilateral w 3d & cad; Future    Colon cancer screening declined    Screening mammography declined    Osteoporosis screening declined          Subjective:   Chief Complaint   Patient presents with   Suzanne Cordero Oka a week ago and hit left side  Unsure if a rib is cracked, very painful to cough and sneezing    Medicare Wellness Visit     Patient declines AWV to be completed         Patient ID: Aurora Trevino is a 69 y o  female      Pt had called 2 days ago to schedule appt for rib pain, stating she fell last week; chart review showed pt is overdue for AWV, so this was discussed on rooming, but pt adamantly refused to do medicare wellness encounter today and stated to staff that she would not be coming back for one, either   Pt is also overdue for routine bloodwork and routine f/u, overdue for mammo, and has never had any colon cancer screening done- pt adamantly refuses any type of colon cancer screen  Fall occurred last Wednesday   States she walked off the back deck, mis-stepped on the "stoop" "and went down" about 1 foot- fell onto left side, states the left lateral rib pain started on Friday after she lifted water container from her dehumidifier  Also sore left shoulder and elbow- improving per pt   Resolving bruise left anterolat shoulder  At end of visit, pt asks what medicare wellness visit entails, and I explain, and she decides to do this during today's encounter      The following portions of the patient's history were reviewed and updated as appropriate: allergies, current medications, past family history, past medical history, past social history, past surgical history and problem list     Review of Systems   Constitutional: Negative  Respiratory: Negative  Cardiovascular: Negative  Objective:      /88 (BP Location: Right arm, Patient Position: Sitting)   Pulse 60   Temp 98 °F (36 7 °C)   Ht 5' 9" (1 753 m)   Wt 81 6 kg (180 lb)   LMP  (LMP Unknown)   SpO2 98%   BMI 26 58 kg/m²          Physical Exam  Constitutional:       General: She is not in acute distress  Appearance: She is well-developed  She is not ill-appearing, toxic-appearing or diaphoretic  HENT:      Head: Normocephalic and atraumatic  Mouth/Throat:      Pharynx: Uvula midline  Neck:      Trachea: Phonation normal    Cardiovascular:      Rate and Rhythm: Normal rate and regular rhythm  Pulses: Normal pulses  Heart sounds: Normal heart sounds  Pulmonary:      Effort: Pulmonary effort is normal       Breath sounds: Normal breath sounds and air entry  Musculoskeletal:      Left elbow: No swelling, deformity, effusion or lacerations  Normal range of motion  Tenderness present in olecranon process  Arms:       Right lower leg: No edema  Left lower leg: No edema  Skin:     General: Skin is warm and dry  Coloration: Skin is not pale  Neurological:      Mental Status: She is alert and oriented to person, place, and time  Gait: Gait normal    Psychiatric:         Mood and Affect: Mood normal          Behavior: Behavior normal  Behavior is cooperative  BMI Counseling: Body mass index is 26 58 kg/m²  The BMI is above normal  Nutrition recommendations include 3-5 servings of fruits/vegetables daily  Exercise recommendations include exercising 3-5 times per week  Falls Plan of Care: Balance, strength, and gait training instructions were provided

## 2022-08-11 ENCOUNTER — HOSPITAL ENCOUNTER (OUTPATIENT)
Dept: RADIOLOGY | Facility: IMAGING CENTER | Age: 67
Discharge: HOME | End: 2022-08-11
Payer: MEDICARE

## 2022-08-11 DIAGNOSIS — M25.522 LEFT ELBOW PAIN: ICD-10-CM

## 2022-08-11 DIAGNOSIS — R07.81 RIB PAIN ON LEFT SIDE: ICD-10-CM

## 2022-08-11 DIAGNOSIS — Z91.81 HISTORY OF RECENT FALL: ICD-10-CM

## 2022-08-11 PROCEDURE — 73080 X-RAY EXAM OF ELBOW: CPT

## 2022-08-11 PROCEDURE — 71101 X-RAY EXAM UNILAT RIBS/CHEST: CPT

## 2022-08-14 PROBLEM — Z53.20 OSTEOPOROSIS SCREENING DECLINED: Status: ACTIVE | Noted: 2022-08-14

## 2022-08-14 PROBLEM — Z53.20 SCREENING MAMMOGRAPHY DECLINED: Status: ACTIVE | Noted: 2022-08-14

## 2022-08-14 PROBLEM — Z53.20 COLON CANCER SCREENING DECLINED: Status: ACTIVE | Noted: 2022-08-14

## 2022-10-11 PROBLEM — Z00.00 MEDICARE ANNUAL WELLNESS VISIT, SUBSEQUENT: Status: RESOLVED | Noted: 2022-08-10 | Resolved: 2022-10-11

## 2023-12-04 ENCOUNTER — OFFICE VISIT (OUTPATIENT)
Dept: FAMILY MEDICINE CLINIC | Facility: CLINIC | Age: 68
End: 2023-12-04
Payer: MEDICARE

## 2023-12-04 VITALS
SYSTOLIC BLOOD PRESSURE: 142 MMHG | DIASTOLIC BLOOD PRESSURE: 78 MMHG | WEIGHT: 178.6 LBS | OXYGEN SATURATION: 97 % | HEIGHT: 69 IN | HEART RATE: 75 BPM | TEMPERATURE: 96.7 F | BODY MASS INDEX: 26.45 KG/M2

## 2023-12-04 DIAGNOSIS — L24.9 IRRITANT DERMATITIS: Primary | ICD-10-CM

## 2023-12-04 PROCEDURE — 99213 OFFICE O/P EST LOW 20 MIN: CPT | Performed by: FAMILY MEDICINE

## 2023-12-04 RX ORDER — CLINDAMYCIN HYDROCHLORIDE 300 MG/1
CAPSULE ORAL
COMMUNITY
Start: 2023-11-30

## 2023-12-04 RX ORDER — TRIAMCINOLONE ACETONIDE 5 MG/G
CREAM TOPICAL 2 TIMES DAILY
Qty: 15 G | Refills: 0 | Status: SHIPPED | OUTPATIENT
Start: 2023-12-04

## 2023-12-04 NOTE — PROGRESS NOTES
Name: Rogelio Silver      : 1955      MRN: [id number]  Encounter Provider: Dima Ruiz MD  Encounter Date: 2023   Encounter department: [facility]ILY [facility] AT [address] [place] [address]. Irritant dermatitis  -     triamcinolone (KENALOG) 0.5 % cream; Apply topically 2 (two) times a day    Unclear trigger but may be related to  silver sulfadiazine topical ointment. No signs of infection today. Will trial Kenalog cream for local irritant dermatitis. Follow-up as needed. Depression Screening and Follow-up Plan: Patient was screened for depression during today's encounter. They screened negative with a PHQ-2 score of 0. Subjective      Presents office for rash. Started on her left earlobe and she put silver sulfadiazine cream on it. The cream was  over 10 years. She does not have any fever or chills. Denies any bug bites or pimples that started. She did not pop anything. It itches now. She tried some hydrocortisone cream which did not really help too much. It itches. It is not getting significantly worse but has not gone away either. Is been going on for a week to few days. Review of Systems   All other systems reviewed and are negative. Current Outpatient Medications on File Prior to Visit   Medication Sig    clindamycin (CLEOCIN) 300 MG capsule        Objective     /78 (BP Location: Left arm, Patient Position: Sitting, Cuff Size: Standard)   Pulse 75   Temp (!) 96.7 °F (35.9 °C) (Tympanic)   Ht 5' 9" (1.753 m)   Wt 81 kg (178 lb 9.6 oz)   LMP  (LMP Unknown)   SpO2 97%   BMI 26.37 kg/m²     Physical Exam  Vitals and nursing note reviewed. Constitutional:       General: She is not in acute distress. Appearance: Normal appearance. She is not ill-appearing, toxic-appearing or diaphoretic. Eyes:      General:         Right eye: No discharge. Left eye: No discharge.       Extraocular Movements: Extraocular movements intact. Conjunctiva/sclera: Conjunctivae normal.   Cardiovascular:      Rate and Rhythm: Normal rate. Pulmonary:      Effort: Pulmonary effort is normal.   Musculoskeletal:      Cervical back: Normal range of motion and neck supple. Skin:     Findings: Rash (left mandibular area and on left ear lobe. ) present. Neurological:      Mental Status: She is alert and oriented to person, place, and time. Psychiatric:         Mood and Affect: Mood normal.         Behavior: Behavior normal.         Thought Content:  Thought content normal.         Judgment: Judgment normal.       Alcides Stringer MD

## 2024-01-05 ENCOUNTER — OFFICE VISIT (OUTPATIENT)
Dept: FAMILY MEDICINE CLINIC | Facility: CLINIC | Age: 69
End: 2024-01-05
Payer: MEDICARE

## 2024-01-05 VITALS
DIASTOLIC BLOOD PRESSURE: 70 MMHG | OXYGEN SATURATION: 97 % | HEART RATE: 96 BPM | SYSTOLIC BLOOD PRESSURE: 160 MMHG | WEIGHT: 179 LBS | BODY MASS INDEX: 26.51 KG/M2 | HEIGHT: 69 IN | TEMPERATURE: 98.5 F

## 2024-01-05 DIAGNOSIS — N39.3 STRESS INCONTINENCE: ICD-10-CM

## 2024-01-05 DIAGNOSIS — R03.0 BLOOD PRESSURE ELEVATED WITHOUT HISTORY OF HTN: ICD-10-CM

## 2024-01-05 DIAGNOSIS — B34.9 VIRAL INFECTION, UNSPECIFIED: Primary | ICD-10-CM

## 2024-01-05 DIAGNOSIS — N39.3 FEMALE STRESS INCONTINENCE: ICD-10-CM

## 2024-01-05 DIAGNOSIS — R05.8 COUGH PRODUCTIVE OF PURULENT SPUTUM: ICD-10-CM

## 2024-01-05 LAB
SARS-COV-2 AG UPPER RESP QL IA: NEGATIVE
SL AMB POCT RAPID FLU A: NORMAL
SL AMB POCT RAPID FLU B: NORMAL
VALID CONTROL: NORMAL

## 2024-01-05 PROCEDURE — 99214 OFFICE O/P EST MOD 30 MIN: CPT | Performed by: FAMILY MEDICINE

## 2024-01-05 PROCEDURE — 87811 SARS-COV-2 COVID19 W/OPTIC: CPT | Performed by: FAMILY MEDICINE

## 2024-01-05 PROCEDURE — 87804 INFLUENZA ASSAY W/OPTIC: CPT | Performed by: FAMILY MEDICINE

## 2024-01-05 RX ORDER — AZITHROMYCIN 500 MG/1
TABLET, FILM COATED ORAL
COMMUNITY
Start: 2023-12-13 | End: 2024-01-05

## 2024-01-05 RX ORDER — BENZONATATE 200 MG/1
200 CAPSULE ORAL 3 TIMES DAILY PRN
Qty: 20 CAPSULE | Refills: 0 | Status: SHIPPED | OUTPATIENT
Start: 2024-01-05 | End: 2024-01-08 | Stop reason: SDUPTHER

## 2024-01-05 RX ORDER — CHLORHEXIDINE GLUCONATE ORAL RINSE 1.2 MG/ML
SOLUTION DENTAL
COMMUNITY
Start: 2023-12-13 | End: 2024-01-05

## 2024-01-05 NOTE — PATIENT INSTRUCTIONS
Urinary Incontinence   AMBULATORY CARE:   Urinary incontinence (UI)  is loss of bladder control. UI develops because your bladder cannot store or empty urine properly. The 3 most common types of UI are stress incontinence, urge incontinence, or both.       Common symptoms include the following:   You feel like your bladder does not empty completely when you urinate.    You urinate often and need to urinate immediately.    You leak urine when you sleep, or you wake up with the urge to urinate.    You leak urine when you cough, sneeze, exercise, or laugh.    Seek care immediately if:   You have severe pain.    You are confused or cannot think clearly.    You see blood in your urine.    You have pain when you urinate.    You have new or worse pain, even after treatment.    Call your doctor if:   You have a fever.    Your mouth feels dry or you have vision changes.    Your urine is cloudy or smells bad.    You have questions or concerns about your condition or care.    Treatment  may include any of the following:  Medicines  may be given to help strengthen your bladder control.    Electrical stimulation  is used to send a small amount of electrical energy to your pelvic floor muscles. This helps control your bladder function. Electrodes may be placed outside your body or in your rectum. For women, the electrodes may be placed in the vagina.    A bulking agent  may be injected into the wall of your urethra to make it thicker. This helps keep your urethra closed and decreases urine leakage.         Devices  such as a clamp, pessary, or tampon may help stop urine leaks. Ask your healthcare provider for more information about these and other devices.    Surgery  may be needed if other treatments do not work. Several types of surgery can help improve your bladder control. Ask your provider for more information about the surgery you may need.    Self-care:   Keep a UI record.  Write down how often you leak urine and how much  you leak. Make a note of what you were doing when you leaked urine.    Drink liquids as directed.  Ask your healthcare provider how much liquid to drink each day and which liquids are best for you. You may need to limit the amount of liquid you drink to help control your urine leakage. Do not drink any liquid right before you go to bed. Limit or do not have drinks that contain caffeine or alcohol.    Prevent constipation.  Eat a variety of high-fiber foods. Good examples are high-fiber cereals, beans, vegetables, and whole-grain breads. Prune juice may help make your bowel movement softer. Walking is the best way to trigger your intestines to have a bowel movement.         Exercise regularly and maintain a healthy weight.  Ask your provider how much you should weigh and about the best exercise plan for you. Weight loss and exercise will decrease pressure on your bladder and help you control your leakage. Ask your provider to help you create a weight loss plan, if needed.         Use a catheter as directed  to help empty your bladder. A catheter is a tiny, plastic tube that is put into your bladder to drain your urine. Your provider may tell you to use a catheter to prevent your bladder from getting too full and leaking urine.    Go to behavior therapy as directed.  Behavior therapy may be used to help you learn to control your urge to urinate.    Follow up with your doctor as directed:  Write down your questions so you remember to ask them during your visits.  © Copyright [employer] 2023 Information is for End User's use only and may not be sold, redistributed or otherwise used for commercial purposes.  The above information is an  only. It is not intended as medical advice for individual conditions or treatments. Talk to your doctor, nurse or pharmacist before following any medical regimen to see if it is safe and effective for you.    Kegel Exercises for Women   AMBULATORY CARE:   Kegel exercises   help strengthen your pelvic muscles. Pelvic muscles hold your pelvic organs, such as your bladder and uterus, in place. Kegel exercises help prevent or control certain conditions, such as urine incontinence (leakage) or uterine prolapse.       Call your doctor or physical therapist if:   You cannot feel your muscles tighten or relax.    You continue to leak urine.    You have questions or concerns about your condition or care.    Use the correct muscles:  Pelvic muscles are the muscles you use to control urine flow. To target these muscles, stop and start the flow of urine several times. This will help you become familiar with how it feels to tighten and relax these muscles.  How to do Kegel exercises:   Get into a comfortable position.  You may lie down, stand up, or sit down to do these exercises. When you first try to do these exercises, it may be easier if you lie down.    Tighten or squeeze your pelvic muscles slowly.  It may feel like you are trying to hold back urine or gas. Hold this position for 3 seconds. Relax for 3 seconds. Repeat this cycle 10 times. Do not hold your breath when you do Kegel exercises. Keep your stomach, back, and leg muscles relaxed.    Do 10 sets of Kegel exercises, at least 3 times a day.  When you know how to do Kegel exercises, use different positions. This will help to strengthen your pelvic muscles as much as possible. You can do these exercises while you lie on the floor, watch TV, or while you stand. Tighten your pelvic muscles before you sneeze, cough, or lift to prevent urine leakage. You may notice improved bladder control within about 6 weeks.    Follow up with your doctor or physical therapist as directed:  Write down your questions so you remember to ask them during your visits.  © Copyright [employer] 2023 Information is for End User's use only and may not be sold, redistributed or otherwise used for commercial purposes.  The above information is an  only.  It is not intended as medical advice for individual conditions or treatments. Talk to your doctor, nurse or pharmacist before following any medical regimen to see if it is safe and effective for you.

## 2024-01-05 NOTE — PROGRESS NOTES
"Name: Jesenia Suarez      : 1955      MRN: [id number]  Encounter Provider: Arin Orozco DO  Encounter Date: 2024   Encounter department: [facility]    Assessment & Plan     1. Viral infection, unspecified  -     POCT rapid flu A and B  -     Poct Covid 19 Rapid Antigen Test    2. Cough productive of purulent sputum  -     benzonatate (TESSALON) 200 MG capsule; Take 1 capsule (200 mg total) by mouth 3 (three) times a day as needed for cough  -     clarithromycin (BIAXIN XL) 500 MG 24 hr tablet; Take 2 tablets (1,000 mg total) by mouth daily for 7 days    3. Blood pressure elevated without history of HTN    4. Stress incontinence    5. Female stress incontinence    Pt mainly \"salt conscious\", but does admit eats Chinese take out about once a month - I advise her that this is very high salt food and she should avoid  I advised return for BP recheck and Medicare AWV; pt has had no labs since 2016 despite orders being given on several occasions since then    Urinary Incontinence Plan of Care: counseling topics discussed: practice Kegel (pelvic floor strengthening) exercises and use restroom every 2 hours.       Subjective      Chief Complaint   Patient presents with    Cough     Bad cough, \"river running down throat\", possible low grade fever, started 4 days ago       Same day sick appt  Pt states, \"Feel like I have a brick on my head- from the nose is clear when I blow, but coughing up yellow\"   \"I didn't have wheezing - did some research online for the RSV\"  \"Of course when I cough have the incontinence thing, which is a real pain\"  Pt's BP is elevated on rooming today and on chart review was elevated at her visit here last month with other physician- I ask about salt intake, and pt starts by saying she and her  \"are very salt conscious, don't do salt, very conscious of the things we buy to be low salt,\" but then reports having Chinese food last night - admits eats Chinese " "take out about once a month - I advise her that this is very high salt food and she should avoid  Pt denies HA's other than assoc with this current acute illness      Review of Systems   Constitutional:  Negative for activity change, chills and diaphoresis.   Respiratory: Negative.     Cardiovascular: Negative.        Current Outpatient Medications on File Prior to Visit   Medication Sig    [DISCONTINUED] azithromycin (ZITHROMAX) 500 MG tablet TAKE 1 TABLET DAILY BEGIN THE MORNING OF SURGERY (Patient not taking: Reported on 1/5/2024)    [DISCONTINUED] chlorhexidine (PERIDEX) 0.12 % solution RINSE MOUTH WITH 1/2 OZ FOR 30-60 SECONDS TWICE DAILY - BEGIN ONE DAY AFTER SURGERY (Patient not taking: Reported on 1/5/2024)    [DISCONTINUED] clindamycin (CLEOCIN) 300 MG capsule  (Patient not taking: Reported on 1/5/2024)    [DISCONTINUED] triamcinolone (KENALOG) 0.5 % cream Apply topically 2 (two) times a day (Patient not taking: Reported on 1/5/2024)       Objective     /70 (BP Location: Left arm, Patient Position: Sitting, Cuff Size: Standard)   Pulse 96   Temp 98.5 °F (36.9 °C) (Tympanic)   Ht 5' 9\" (1.753 m)   Wt 81.2 kg (179 lb)   LMP  (LMP Unknown)   SpO2 97%   BMI 26.43 kg/m²     Physical Exam  Vitals and nursing note reviewed.   Constitutional:       General: She is not in acute distress.     Appearance: She is well-groomed. She is ill-appearing (mildly). She is not toxic-appearing or diaphoretic.   HENT:      Head: Normocephalic and atraumatic.      Right Ear: Tympanic membrane and ear canal normal.      Left Ear: Tympanic membrane and ear canal normal.      Nose: Congestion present.      Right Sinus: No maxillary sinus tenderness or frontal sinus tenderness.      Left Sinus: No maxillary sinus tenderness or frontal sinus tenderness.      Mouth/Throat:      Lips: Pink.      Mouth: Mucous membranes are moist.      Pharynx: Oropharynx is clear. Uvula midline.   Eyes:      General: Lids are normal.      " Conjunctiva/sclera: Conjunctivae normal.   Neck:      Trachea: Phonation normal.   Cardiovascular:      Rate and Rhythm: Normal rate and regular rhythm.      Heart sounds: Normal heart sounds.   Pulmonary:      Effort: Pulmonary effort is normal.      Breath sounds: Normal breath sounds and air entry.   Lymphadenopathy:      Cervical: No cervical adenopathy.   Skin:     General: Skin is warm and dry.      Coloration: Skin is not pale.   Neurological:      Mental Status: She is alert and oriented to person, place, and time.      Gait: Gait normal.   Psychiatric:         Behavior: Behavior is cooperative.       Arin Orozco DO

## 2024-01-08 ENCOUNTER — TELEPHONE (OUTPATIENT)
Dept: FAMILY MEDICINE CLINIC | Facility: CLINIC | Age: 69
End: 2024-01-08

## 2024-01-08 DIAGNOSIS — R05.8 COUGH PRODUCTIVE OF PURULENT SPUTUM: ICD-10-CM

## 2024-01-08 RX ORDER — BENZONATATE 200 MG/1
200 CAPSULE ORAL 3 TIMES DAILY PRN
Qty: 20 CAPSULE | Refills: 0 | Status: SHIPPED | OUTPATIENT
Start: 2024-01-08

## 2024-01-09 ENCOUNTER — NURSE TRIAGE (OUTPATIENT)
Age: 69
End: 2024-01-09

## 2024-01-09 NOTE — TELEPHONE ENCOUNTER
"Patient called regarding the possible side effects regarding antibiotic. Patient denies any side effects but is concerned regarding a C-diff infection. Patient was advised to use a probiotic at her doctor visit. Patient states that she is doing so. I di discuss with the patient to call us in the office if she experiencing any side effects. Patient verbalizes understanding and agrees to take the antibiotic.  Reason for Disposition  • Pharmacy calling with prescription question and triager answers question    Answer Assessment - Initial Assessment Questions  1. NAME of MEDICATION: \"What medicine are you calling about?\"      claithyromycin  2. QUESTION: \"What is your question?\" (e.g., medication refill, side effect)      Can I get C-diff  3. PRESCRIBING HCP: \"Who prescribed it?\" Reason: if prescribed by specialist, call should be referred to that group.      PCP  4. SYMPTOMS: \"Do you have any symptoms?\"      No  5. SEVERITY: If symptoms are present, ask \"Are they mild, moderate or severe?\"      N/A  6. PREGNANCY:  \"Is there any chance that you are pregnant?\" \"When was your last menstrual period?\"      N/A    Protocols used: Medication Question Call-ADULT-[place]    "

## 2024-01-09 NOTE — TELEPHONE ENCOUNTER
Regarding: Rx side effects  ----- Message from Mei Rios sent at 1/9/2024  5:04 PM EST -----  Patient picked up Rx for clarithromycin.  After reading the enclosed side effects, is very concerned about taking the medication.  She would like to discuss it's safety prior to taking any more.  She currently has only taken 1 tablet.

## 2024-01-09 NOTE — TELEPHONE ENCOUNTER
Ok - done  
Pt would like the clarithromycin sent to [facility] in [place] instead of [facility]  
Home

## 2024-01-29 ENCOUNTER — TELEPHONE (OUTPATIENT)
Dept: FAMILY MEDICINE CLINIC | Facility: CLINIC | Age: 69
End: 2024-01-29

## 2024-02-21 PROBLEM — Z01.419 ENCOUNTER FOR GYNECOLOGICAL EXAMINATION WITHOUT ABNORMAL FINDING: Status: RESOLVED | Noted: 2018-09-17 | Resolved: 2024-02-21

## 2024-09-18 ENCOUNTER — TELEPHONE (OUTPATIENT)
Dept: FAMILY MEDICINE CLINIC | Facility: CLINIC | Age: 69
End: 2024-09-18

## 2024-09-18 NOTE — TELEPHONE ENCOUNTER
Goal Outcome Evaluation:  Plan of Care Reviewed With: patient        Progress: no change  Outcome Evaluation: Patient presents at or near baseline functional status with no functional deficits related to strength, balance, transfers or mobility that impede patient independence with activities of daily living.  No indicated need for skilled occupational therapy intervention in the acute care setting.  Occupational therapy will sign off at this time. Recommend return home when medically cleared to do so.          Lmom for patient to call office and schedule medicare wellness visit

## 2024-09-30 ENCOUNTER — TELEPHONE (OUTPATIENT)
Dept: FAMILY MEDICINE CLINIC | Facility: CLINIC | Age: 69
End: 2024-09-30

## 2024-10-30 ENCOUNTER — OFFICE VISIT (OUTPATIENT)
Dept: FAMILY MEDICINE CLINIC | Facility: CLINIC | Age: 69
End: 2024-10-30
Payer: MEDICARE

## 2024-10-30 VITALS
RESPIRATION RATE: 17 BRPM | WEIGHT: 184.4 LBS | BODY MASS INDEX: 27.31 KG/M2 | DIASTOLIC BLOOD PRESSURE: 82 MMHG | SYSTOLIC BLOOD PRESSURE: 140 MMHG | HEIGHT: 69 IN | TEMPERATURE: 97.3 F | OXYGEN SATURATION: 98 % | HEART RATE: 78 BPM

## 2024-10-30 DIAGNOSIS — M25.512 CHRONIC LEFT SHOULDER PAIN: ICD-10-CM

## 2024-10-30 DIAGNOSIS — J01.00 ACUTE MAXILLARY SINUSITIS, RECURRENCE NOT SPECIFIED: Primary | ICD-10-CM

## 2024-10-30 DIAGNOSIS — G89.29 CHRONIC LEFT SHOULDER PAIN: ICD-10-CM

## 2024-10-30 DIAGNOSIS — M79.622 LEFT UPPER ARM PAIN: ICD-10-CM

## 2024-10-30 PROCEDURE — 99214 OFFICE O/P EST MOD 30 MIN: CPT | Performed by: FAMILY MEDICINE

## 2024-10-30 PROCEDURE — G2211 COMPLEX E/M VISIT ADD ON: HCPCS | Performed by: FAMILY MEDICINE

## 2024-10-30 RX ORDER — DOXYCYCLINE HYCLATE 100 MG
100 TABLET ORAL 2 TIMES DAILY
Qty: 14 TABLET | Refills: 0 | Status: SHIPPED | OUTPATIENT
Start: 2024-10-30 | End: 2024-11-06

## 2024-10-30 NOTE — PROGRESS NOTES
"Ambulatory Visit  Name: Jesenia Suarez      : 1955      MRN: [id number]  Encounter Provider: Arin Orozco DO  Encounter Date: 10/30/2024   Encounter department: [facility]    Assessment & Plan  Acute maxillary sinusitis, recurrence not specified    Orders:    doxycycline hyclate (VIBRA-TABS) 100 mg tablet; Take 1 tablet (100 mg total) by mouth 2 (two) times a day for 7 days    Chronic left shoulder pain  Pt declines order for PT; I instructed her in Codman exercises to try        Left upper arm pain  Also chronic, associated with the left shoulder pain; as above, pt declines PT, I instructed her in Codman exercises          Chief Complaint   Patient presents with    Earache     B/L ear ringing       History of Present Illness     Same day sick appt- c/o \"issue with both ears, nonstop ringing and water in ears (pressure)\"per scheduling notes  Pt has not been seen for any routine care here for more than 2 years, only visits since then have been same day sick appts; she has not responded to numerous messages to schedule overdue Medicare AWV, and has not been agreeable to doing routine labs, mammo, DEXA, or colon cancer screening  States ear pressure sensation has been ongoing for couple of weeks   No known ill contacts  No fever, chills, sweats  \"Feel it draining down the back\"  Also c/o \"my arm- don't think it's rotator cuff, but it hurts\"- points to left upper arm  \"Been awhile that it started, did something to it, but don't remember what I did to it, but it hurts, like to sleep on left side and really hurts when get up in the morning\"  Pain is intermittent, \"when I do certain things\" per pt            Review of Systems        Objective     /82 (BP Location: Left arm, Patient Position: Sitting, Cuff Size: Standard)   Pulse 78   Temp (!) 97.3 °F (36.3 °C) (Tympanic)   Resp 17   Ht 5' 9\" (1.753 m)   Wt 83.6 kg (184 lb 6.4 oz)   LMP  (LMP Unknown)   SpO2 98%   BMI 27.23 " kg/m²     Physical Exam  Vitals and nursing note reviewed.   Constitutional:       General: She is not in acute distress.     Appearance: She is well-groomed. She is not ill-appearing, toxic-appearing or diaphoretic.   HENT:      Head: Normocephalic and atraumatic.      Right Ear: Tympanic membrane, ear canal and external ear normal.      Left Ear: Tympanic membrane, ear canal and external ear normal.      Nose: Mucosal edema and congestion present.      Right Sinus: Maxillary sinus tenderness present.      Left Sinus: Maxillary sinus tenderness present.      Mouth/Throat:      Lips: Pink.      Mouth: Mucous membranes are moist.      Pharynx: Oropharynx is clear. Uvula midline.   Eyes:      General: Lids are normal.      Conjunctiva/sclera: Conjunctivae normal.   Neck:      Trachea: Trachea and phonation normal.   Cardiovascular:      Rate and Rhythm: Normal rate and regular rhythm.      Heart sounds: Normal heart sounds.   Pulmonary:      Effort: Pulmonary effort is normal.      Breath sounds: Normal breath sounds and air entry.   Musculoskeletal:      Left shoulder: Tenderness (coracoid process and AC joint mildly) present. No swelling, deformity, effusion, bony tenderness or crepitus. Decreased range of motion (mildly in abduction). Normal strength. Normal pulse.      Cervical back: Neck supple.   Lymphadenopathy:      Cervical: No cervical adenopathy.   Skin:     Coloration: Skin is not pale.   Neurological:      Mental Status: She is alert and oriented to person, place, and time.      Sensory: Sensation is intact.      Motor: Motor function is intact.   Psychiatric:         Mood and Affect: Mood normal.         Behavior: Behavior is cooperative.

## 2024-11-03 PROBLEM — G89.29 CHRONIC LEFT SHOULDER PAIN: Status: ACTIVE | Noted: 2024-11-03

## 2024-11-03 PROBLEM — M25.512 CHRONIC LEFT SHOULDER PAIN: Status: ACTIVE | Noted: 2024-11-03

## 2025-06-09 ENCOUNTER — TELEPHONE (OUTPATIENT)
Dept: FAMILY MEDICINE CLINIC | Facility: CLINIC | Age: 70
End: 2025-06-09

## 2025-07-22 ENCOUNTER — TELEPHONE (OUTPATIENT)
Age: 70
End: 2025-07-22

## 2025-07-22 ENCOUNTER — PREP FOR PROCEDURE (OUTPATIENT)
Age: 70
End: 2025-07-22

## 2025-07-22 DIAGNOSIS — Z12.11 SCREENING FOR COLON CANCER: Primary | ICD-10-CM

## 2025-07-22 NOTE — TELEPHONE ENCOUNTER
07/22/25  Screened by: Alana Munoz MA    Referring Provider PCP    Pre- Screening: BMI: 27.17 kg/m²     Has patient been referred for a routine screening Colonoscopy? yes  Is the patient between 45-75 years old? yes      Previous Colonoscopy no   If yes:    Date:     Facility:     Reason:       Does the patient want to see a Gastroenterologist prior to their procedure OR are they having any GI symptoms? no    Has the patient been hospitalized or had abdominal surgery in the past 6 months? no    Does the patient use supplemental oxygen? no    Does the patient take Coumadin, Lovenox, Plavix, Elliquis, Xarelto, or other blood thinning medication? no    Has the patient had a stroke, cardiac event, or stent placed in the past year? no

## 2025-07-22 NOTE — TELEPHONE ENCOUNTER
Scheduled date of colonoscopy (as of today): 7/31/25  Physician performing colonoscopy:SANDRA  Location of colonoscopy: [facility]  Bowel prep reviewed with patient: WILLIAM/MAURO  Instructions reviewed with patient by: RILEY  Clearances: NA  Screening  EMAIL

## 2025-07-31 ENCOUNTER — ANESTHESIA EVENT (OUTPATIENT)
Dept: GASTROENTEROLOGY | Facility: HOSPITAL | Age: 70
End: 2025-07-31
Payer: MEDICARE

## 2025-07-31 ENCOUNTER — HOSPITAL ENCOUNTER (OUTPATIENT)
Dept: GASTROENTEROLOGY | Facility: HOSPITAL | Age: 70
Setting detail: OUTPATIENT SURGERY
Discharge: HOME | End: 2025-07-31
Attending: STUDENT IN AN ORGANIZED HEALTH CARE EDUCATION/TRAINING PROGRAM
Payer: MEDICARE

## 2025-07-31 ENCOUNTER — ANESTHESIA (OUTPATIENT)
Dept: GASTROENTEROLOGY | Facility: HOSPITAL | Age: 70
End: 2025-07-31
Payer: MEDICARE

## 2025-07-31 VITALS
RESPIRATION RATE: 16 BRPM | DIASTOLIC BLOOD PRESSURE: 66 MMHG | OXYGEN SATURATION: 99 % | TEMPERATURE: 98.1 F | SYSTOLIC BLOOD PRESSURE: 148 MMHG | HEART RATE: 60 BPM

## 2025-07-31 DIAGNOSIS — Z12.11 SCREENING FOR COLON CANCER: ICD-10-CM

## 2025-07-31 PROCEDURE — 45380 COLONOSCOPY AND BIOPSY: CPT | Performed by: STUDENT IN AN ORGANIZED HEALTH CARE EDUCATION/TRAINING PROGRAM

## 2025-07-31 PROCEDURE — 45385 COLONOSCOPY W/LESION REMOVAL: CPT | Performed by: STUDENT IN AN ORGANIZED HEALTH CARE EDUCATION/TRAINING PROGRAM

## 2025-07-31 PROCEDURE — 45381 COLONOSCOPY SUBMUCOUS NJX: CPT | Performed by: STUDENT IN AN ORGANIZED HEALTH CARE EDUCATION/TRAINING PROGRAM

## 2025-07-31 PROCEDURE — 88305 TISSUE EXAM BY PATHOLOGIST: CPT | Performed by: PATHOLOGY

## 2025-07-31 RX ORDER — SODIUM CHLORIDE, SODIUM LACTATE, POTASSIUM CHLORIDE, CALCIUM CHLORIDE 600; 310; 30; 20 MG/100ML; MG/100ML; MG/100ML; MG/100ML
125 INJECTION, SOLUTION INTRAVENOUS CONTINUOUS
Status: DISCONTINUED | OUTPATIENT
Start: 2025-07-31 | End: 2025-08-04 | Stop reason: HOSPADM

## 2025-07-31 RX ORDER — LIDOCAINE HYDROCHLORIDE 10 MG/ML
INJECTION, SOLUTION EPIDURAL; INFILTRATION; INTRACAUDAL; PERINEURAL AS NEEDED
Status: DISCONTINUED | OUTPATIENT
Start: 2025-07-31 | End: 2025-07-31

## 2025-07-31 RX ORDER — PROPOFOL 10 MG/ML
INJECTION, EMULSION INTRAVENOUS AS NEEDED
Status: DISCONTINUED | OUTPATIENT
Start: 2025-07-31 | End: 2025-07-31

## 2025-07-31 RX ADMIN — PROPOFOL 100 MCG/KG/MIN: 10 INJECTION, EMULSION INTRAVENOUS at 09:32

## 2025-07-31 RX ADMIN — LIDOCAINE HYDROCHLORIDE 50 MG: 10 INJECTION, SOLUTION EPIDURAL; INFILTRATION; INTRACAUDAL at 09:30

## 2025-07-31 RX ADMIN — PROPOFOL 100 MG: 10 INJECTION, EMULSION INTRAVENOUS at 09:30

## 2025-07-31 RX ADMIN — SODIUM CHLORIDE, SODIUM LACTATE, POTASSIUM CHLORIDE, AND CALCIUM CHLORIDE 125 ML/HR: .6; .31; .03; .02 INJECTION, SOLUTION INTRAVENOUS at 08:26

## 2025-08-07 ENCOUNTER — PREP FOR PROCEDURE (OUTPATIENT)
Dept: GASTROENTEROLOGY | Facility: CLINIC | Age: 70
End: 2025-08-07

## 2025-08-07 DIAGNOSIS — D36.9 TUBULOVILLOUS ADENOMA: Primary | ICD-10-CM

## 2025-08-07 DIAGNOSIS — D36.9 TUBULOVILLOUS ADENOMA: ICD-10-CM

## 2025-08-07 RX ORDER — BISACODYL 5 MG/1
20 TABLET, DELAYED RELEASE ORAL ONCE
Qty: 4 TABLET | Refills: 0 | Status: SHIPPED | OUTPATIENT
Start: 2025-08-07 | End: 2025-08-07

## 2025-08-07 RX ORDER — BISACODYL 5 MG
TABLET, DELAYED RELEASE (ENTERIC COATED) ORAL
Qty: 4 TABLET | Refills: 0 | Status: SHIPPED | OUTPATIENT
Start: 2025-08-07

## 2025-08-07 RX ORDER — POLYETHYLENE GLYCOL 3350 17 G/17G
238 POWDER, FOR SOLUTION ORAL ONCE
Qty: 238 G | Refills: 0 | Status: SHIPPED | OUTPATIENT
Start: 2025-08-07 | End: 2025-08-07

## 2025-08-07 RX ORDER — SODIUM CHLORIDE, SODIUM LACTATE, POTASSIUM CHLORIDE, CALCIUM CHLORIDE 600; 310; 30; 20 MG/100ML; MG/100ML; MG/100ML; MG/100ML
125 INJECTION, SOLUTION INTRAVENOUS CONTINUOUS
OUTPATIENT
Start: 2025-08-07

## (undated) DEVICE — GLOVE INDICATOR PI UNDERGLOVE SZ 6.5 BLUE

## (undated) DEVICE — PREP PAD BNS: Brand: CONVERTORS

## (undated) DEVICE — GLOVE SRG BIOGEL ORTHOPEDIC 8

## (undated) DEVICE — KERLIX BANDAGE ROLL: Brand: KERLIX

## (undated) DEVICE — PAD CAST 4 IN COTTON NON STERILE

## (undated) DEVICE — GLOVE INDICATOR UNDERGLOVE SZ 6 BLUE

## (undated) DEVICE — SYRINGE 50ML LL

## (undated) DEVICE — PENCIL ELECTROSURG E-Z CLEAN -0035H

## (undated) DEVICE — 2000CC GUARDIAN II: Brand: GUARDIAN

## (undated) DEVICE — CHLORAPREP HI-LITE 26ML ORANGE

## (undated) DEVICE — ABDOMINAL PAD: Brand: DERMACEA

## (undated) DEVICE — GLOVE SRG BIOGEL 6

## (undated) DEVICE — SYRINGE 3ML LL

## (undated) DEVICE — ACE WRAP 4 IN UNSTERILE

## (undated) DEVICE — SUT MONOCRYL 4-0 PS-2 27 IN Y426H

## (undated) DEVICE — NEEDLE 18 G X 1 1/2

## (undated) DEVICE — TAPE CAST 4IN FIBERGLASS 4YD WHITE

## (undated) DEVICE — 3M™ STERI-DRAPE™ U-DRAPE 1015: Brand: STERI-DRAPE™

## (undated) DEVICE — BETHLEHEM UNIVERSAL  MIONR EXT: Brand: CARDINAL HEALTH

## (undated) DEVICE — CUFF TOURNIQUET 34 X 4 IN QUICK CONNECT DISP 1BLA

## (undated) DEVICE — GLOVE INDICATOR PI UNDERGLOVE SZ 7 BLUE

## (undated) DEVICE — NEEDLE 25G X 1 1/2

## (undated) DEVICE — VESSEL LOOP MAXI - RED

## (undated) DEVICE — SCD SEQUENTIAL COMPRESSION COMFORT SLEEVE MEDIUM KNEE LENGTH: Brand: KENDALL SCD

## (undated) DEVICE — OCCLUSIVE GAUZE STRIP,3% BISMUTH TRIBROMOPHENATE IN PETROLATUM BLEND: Brand: XEROFORM

## (undated) DEVICE — SYRINGE 10ML LL

## (undated) DEVICE — WEBRIL 6 IN UNSTERILE

## (undated) DEVICE — INTENDED FOR TISSUE SEPARATION, AND OTHER PROCEDURES THAT REQUIRE A SHARP SURGICAL BLADE TO PUNCTURE OR CUT.: Brand: BARD-PARKER ® CARBON RIB-BACK BLADES